# Patient Record
Sex: FEMALE | Race: WHITE | NOT HISPANIC OR LATINO | URBAN - METROPOLITAN AREA
[De-identification: names, ages, dates, MRNs, and addresses within clinical notes are randomized per-mention and may not be internally consistent; named-entity substitution may affect disease eponyms.]

---

## 2018-03-15 ENCOUNTER — OFFICE VISIT CONVERTED (OUTPATIENT)
Dept: SURGERY | Facility: CLINIC | Age: 83
End: 2018-03-15
Attending: PHYSICIAN ASSISTANT

## 2018-04-05 ENCOUNTER — OFFICE VISIT CONVERTED (OUTPATIENT)
Dept: SURGERY | Facility: CLINIC | Age: 83
End: 2018-04-05
Attending: PHYSICIAN ASSISTANT

## 2018-05-07 ENCOUNTER — OFFICE VISIT CONVERTED (OUTPATIENT)
Dept: SURGERY | Facility: CLINIC | Age: 83
End: 2018-05-07
Attending: PHYSICIAN ASSISTANT

## 2018-05-07 ENCOUNTER — CONVERSION ENCOUNTER (OUTPATIENT)
Dept: SURGERY | Facility: CLINIC | Age: 83
End: 2018-05-07

## 2020-12-30 ENCOUNTER — HOSPITAL ENCOUNTER (OUTPATIENT)
Dept: MAMMOGRAPHY | Facility: HOSPITAL | Age: 85
Discharge: HOME | End: 2020-12-30
Attending: INTERNAL MEDICINE

## 2021-03-08 ENCOUNTER — HOSPITAL ENCOUNTER (OUTPATIENT)
Dept: VACCINE CLINIC | Facility: HOSPITAL | Age: 86
Discharge: HOME | End: 2021-03-08
Attending: INTERNAL MEDICINE

## 2021-03-29 ENCOUNTER — HOSPITAL ENCOUNTER (OUTPATIENT)
Dept: VACCINE CLINIC | Facility: HOSPITAL | Age: 86
Discharge: HOME | End: 2021-03-29
Attending: INTERNAL MEDICINE

## 2021-05-16 VITALS — WEIGHT: 225.5 LBS | BODY MASS INDEX: 35.39 KG/M2 | HEIGHT: 67 IN | RESPIRATION RATE: 10 BRPM

## 2021-05-16 VITALS — WEIGHT: 228.5 LBS | BODY MASS INDEX: 35.87 KG/M2 | RESPIRATION RATE: 12 BRPM | HEIGHT: 67 IN

## 2021-05-16 VITALS — HEART RATE: 57 BPM | WEIGHT: 228 LBS | OXYGEN SATURATION: 96 % | BODY MASS INDEX: 35.79 KG/M2 | HEIGHT: 67 IN

## 2022-08-11 ENCOUNTER — APPOINTMENT (OUTPATIENT)
Dept: CT IMAGING | Facility: HOSPITAL | Age: 87
End: 2022-08-11

## 2022-08-11 ENCOUNTER — HOSPITAL ENCOUNTER (INPATIENT)
Facility: HOSPITAL | Age: 87
LOS: 3 days | Discharge: HOME | End: 2022-08-14
Attending: EMERGENCY MEDICINE | Admitting: EMERGENCY MEDICINE

## 2022-08-11 ENCOUNTER — APPOINTMENT (OUTPATIENT)
Dept: GENERAL RADIOLOGY | Facility: HOSPITAL | Age: 87
End: 2022-08-11

## 2022-08-11 DIAGNOSIS — R10.9 ABDOMINAL PAIN, UNSPECIFIED ABDOMINAL LOCATION: ICD-10-CM

## 2022-08-11 DIAGNOSIS — R79.89 ELEVATED LFTS: ICD-10-CM

## 2022-08-11 DIAGNOSIS — R00.1 SYMPTOMATIC BRADYCARDIA: Primary | ICD-10-CM

## 2022-08-11 DIAGNOSIS — K44.9 HIATAL HERNIA: ICD-10-CM

## 2022-08-11 DIAGNOSIS — R19.7 NAUSEA VOMITING AND DIARRHEA: ICD-10-CM

## 2022-08-11 DIAGNOSIS — E87.6 HYPOKALEMIA: ICD-10-CM

## 2022-08-11 DIAGNOSIS — R11.2 NAUSEA VOMITING AND DIARRHEA: ICD-10-CM

## 2022-08-11 LAB
ALBUMIN SERPL-MCNC: 4.2 G/DL (ref 3.5–5.2)
ALBUMIN SERPL-MCNC: 4.4 G/DL (ref 3.5–5.2)
ALBUMIN/GLOB SERPL: 1.7 G/DL
ALBUMIN/GLOB SERPL: 1.8 G/DL
ALP SERPL-CCNC: 76 U/L (ref 39–117)
ALP SERPL-CCNC: 82 U/L (ref 39–117)
ALT SERPL W P-5'-P-CCNC: 2362 U/L (ref 1–33)
ALT SERPL W P-5'-P-CCNC: 2951 U/L (ref 1–33)
ANION GAP SERPL CALCULATED.3IONS-SCNC: 15.2 MMOL/L (ref 5–15)
ANION GAP SERPL CALCULATED.3IONS-SCNC: 16.3 MMOL/L (ref 5–15)
AST SERPL-CCNC: 1347 U/L (ref 1–32)
AST SERPL-CCNC: 2081 U/L (ref 1–32)
BACTERIA UR QL AUTO: ABNORMAL /HPF
BASOPHILS # BLD AUTO: 0.04 10*3/MM3 (ref 0–0.2)
BASOPHILS NFR BLD AUTO: 0.6 % (ref 0–1.5)
BILIRUB SERPL-MCNC: 1.1 MG/DL (ref 0–1.2)
BILIRUB SERPL-MCNC: 1.3 MG/DL (ref 0–1.2)
BILIRUB UR QL STRIP: NEGATIVE
BUN SERPL-MCNC: 17 MG/DL (ref 8–23)
BUN SERPL-MCNC: 20 MG/DL (ref 8–23)
BUN/CREAT SERPL: 26.3 (ref 7–25)
BUN/CREAT SERPL: 26.6 (ref 7–25)
CALCIUM SPEC-SCNC: 8.9 MG/DL (ref 8.6–10.5)
CALCIUM SPEC-SCNC: 9.3 MG/DL (ref 8.6–10.5)
CHLORIDE SERPL-SCNC: 103 MMOL/L (ref 98–107)
CHLORIDE SERPL-SCNC: 105 MMOL/L (ref 98–107)
CLARITY UR: ABNORMAL
CO2 SERPL-SCNC: 22.8 MMOL/L (ref 22–29)
CO2 SERPL-SCNC: 24.7 MMOL/L (ref 22–29)
COLOR UR: YELLOW
CREAT SERPL-MCNC: 0.64 MG/DL (ref 0.57–1)
CREAT SERPL-MCNC: 0.76 MG/DL (ref 0.57–1)
D-LACTATE SERPL-SCNC: 1.5 MMOL/L (ref 0.5–2)
DEPRECATED RDW RBC AUTO: 44.2 FL (ref 37–54)
DEPRECATED RDW RBC AUTO: 44.7 FL (ref 37–54)
EGFRCR SERPLBLD CKD-EPI 2021: 77.4 ML/MIN/1.73
EGFRCR SERPLBLD CKD-EPI 2021: 87.3 ML/MIN/1.73
EOSINOPHIL # BLD AUTO: 0.17 10*3/MM3 (ref 0–0.4)
EOSINOPHIL NFR BLD AUTO: 2.4 % (ref 0.3–6.2)
ERYTHROCYTE [DISTWIDTH] IN BLOOD BY AUTOMATED COUNT: 13.1 % (ref 12.3–15.4)
ERYTHROCYTE [DISTWIDTH] IN BLOOD BY AUTOMATED COUNT: 13.2 % (ref 12.3–15.4)
GLOBULIN UR ELPH-MCNC: 2.3 GM/DL
GLOBULIN UR ELPH-MCNC: 2.6 GM/DL
GLUCOSE SERPL-MCNC: 77 MG/DL (ref 65–99)
GLUCOSE SERPL-MCNC: 86 MG/DL (ref 65–99)
GLUCOSE UR STRIP-MCNC: NEGATIVE MG/DL
HCT VFR BLD AUTO: 37.3 % (ref 34–46.6)
HCT VFR BLD AUTO: 40.1 % (ref 34–46.6)
HGB BLD-MCNC: 13.2 G/DL (ref 12–15.9)
HGB BLD-MCNC: 14 G/DL (ref 12–15.9)
HGB UR QL STRIP.AUTO: ABNORMAL
HOLD SPECIMEN: NORMAL
HOLD SPECIMEN: NORMAL
HYALINE CASTS UR QL AUTO: ABNORMAL /LPF
IMM GRANULOCYTES # BLD AUTO: 0.04 10*3/MM3 (ref 0–0.05)
IMM GRANULOCYTES NFR BLD AUTO: 0.6 % (ref 0–0.5)
KETONES UR QL STRIP: ABNORMAL
LEUKOCYTE ESTERASE UR QL STRIP.AUTO: ABNORMAL
LIPASE SERPL-CCNC: 15 U/L (ref 13–60)
LYMPHOCYTES # BLD AUTO: 1.15 10*3/MM3 (ref 0.7–3.1)
LYMPHOCYTES NFR BLD AUTO: 16.2 % (ref 19.6–45.3)
MAGNESIUM SERPL-MCNC: 2 MG/DL (ref 1.6–2.4)
MCH RBC QN AUTO: 32.5 PG (ref 26.6–33)
MCH RBC QN AUTO: 32.5 PG (ref 26.6–33)
MCHC RBC AUTO-ENTMCNC: 34.9 G/DL (ref 31.5–35.7)
MCHC RBC AUTO-ENTMCNC: 35.4 G/DL (ref 31.5–35.7)
MCV RBC AUTO: 91.9 FL (ref 79–97)
MCV RBC AUTO: 93 FL (ref 79–97)
MONOCYTES # BLD AUTO: 0.32 10*3/MM3 (ref 0.1–0.9)
MONOCYTES NFR BLD AUTO: 4.5 % (ref 5–12)
NEUTROPHILS NFR BLD AUTO: 5.38 10*3/MM3 (ref 1.7–7)
NEUTROPHILS NFR BLD AUTO: 75.7 % (ref 42.7–76)
NITRITE UR QL STRIP: NEGATIVE
NRBC BLD AUTO-RTO: 0 /100 WBC (ref 0–0.2)
PH UR STRIP.AUTO: 6 [PH] (ref 5–8)
PLATELET # BLD AUTO: 222 10*3/MM3 (ref 140–450)
PLATELET # BLD AUTO: 234 10*3/MM3 (ref 140–450)
PMV BLD AUTO: 10 FL (ref 6–12)
PMV BLD AUTO: 9.8 FL (ref 6–12)
POTASSIUM SERPL-SCNC: 2.6 MMOL/L (ref 3.5–5.2)
POTASSIUM SERPL-SCNC: 2.6 MMOL/L (ref 3.5–5.2)
PROT SERPL-MCNC: 6.5 G/DL (ref 6–8.5)
PROT SERPL-MCNC: 7 G/DL (ref 6–8.5)
PROT UR QL STRIP: ABNORMAL
RBC # BLD AUTO: 4.06 10*6/MM3 (ref 3.77–5.28)
RBC # BLD AUTO: 4.31 10*6/MM3 (ref 3.77–5.28)
RBC # UR STRIP: ABNORMAL /HPF
REF LAB TEST METHOD: ABNORMAL
SODIUM SERPL-SCNC: 143 MMOL/L (ref 136–145)
SODIUM SERPL-SCNC: 144 MMOL/L (ref 136–145)
SP GR UR STRIP: 1.02 (ref 1–1.03)
SQUAMOUS #/AREA URNS HPF: ABNORMAL /HPF
T4 FREE SERPL-MCNC: 1.2 NG/DL (ref 0.93–1.7)
TROPONIN I SERPL-MCNC: 0.06 NG/ML (ref 0–0.08)
TSH SERPL DL<=0.05 MIU/L-ACNC: 3.38 UIU/ML (ref 0.27–4.2)
UROBILINOGEN UR QL STRIP: ABNORMAL
WBC # UR STRIP: ABNORMAL /HPF
WBC NRBC COR # BLD: 7.1 10*3/MM3 (ref 3.4–10.8)
WBC NRBC COR # BLD: 7.23 10*3/MM3 (ref 3.4–10.8)
WHOLE BLOOD HOLD COAG: NORMAL
WHOLE BLOOD HOLD SPECIMEN: NORMAL

## 2022-08-11 PROCEDURE — 83690 ASSAY OF LIPASE: CPT | Performed by: EMERGENCY MEDICINE

## 2022-08-11 PROCEDURE — 87040 BLOOD CULTURE FOR BACTERIA: CPT | Performed by: INTERNAL MEDICINE

## 2022-08-11 PROCEDURE — 84443 ASSAY THYROID STIM HORMONE: CPT | Performed by: INTERNAL MEDICINE

## 2022-08-11 PROCEDURE — 86235 NUCLEAR ANTIGEN ANTIBODY: CPT | Performed by: INTERNAL MEDICINE

## 2022-08-11 PROCEDURE — 87088 URINE BACTERIA CULTURE: CPT | Performed by: EMERGENCY MEDICINE

## 2022-08-11 PROCEDURE — 86431 RHEUMATOID FACTOR QUANT: CPT | Performed by: INTERNAL MEDICINE

## 2022-08-11 PROCEDURE — 74177 CT ABD & PELVIS W/CONTRAST: CPT

## 2022-08-11 PROCEDURE — 85025 COMPLETE CBC W/AUTO DIFF WBC: CPT | Performed by: EMERGENCY MEDICINE

## 2022-08-11 PROCEDURE — 83735 ASSAY OF MAGNESIUM: CPT | Performed by: NURSE PRACTITIONER

## 2022-08-11 PROCEDURE — 83605 ASSAY OF LACTIC ACID: CPT | Performed by: EMERGENCY MEDICINE

## 2022-08-11 PROCEDURE — 85027 COMPLETE CBC AUTOMATED: CPT | Performed by: INTERNAL MEDICINE

## 2022-08-11 PROCEDURE — 93005 ELECTROCARDIOGRAM TRACING: CPT | Performed by: NURSE PRACTITIONER

## 2022-08-11 PROCEDURE — 81001 URINALYSIS AUTO W/SCOPE: CPT | Performed by: EMERGENCY MEDICINE

## 2022-08-11 PROCEDURE — P9612 CATHETERIZE FOR URINE SPEC: HCPCS

## 2022-08-11 PROCEDURE — 36415 COLL VENOUS BLD VENIPUNCTURE: CPT | Performed by: EMERGENCY MEDICINE

## 2022-08-11 PROCEDURE — 93010 ELECTROCARDIOGRAM REPORT: CPT | Performed by: INTERNAL MEDICINE

## 2022-08-11 PROCEDURE — 0 IOPAMIDOL PER 1 ML: Performed by: EMERGENCY MEDICINE

## 2022-08-11 PROCEDURE — 25010000002 CEFTRIAXONE PER 250 MG: Performed by: NURSE PRACTITIONER

## 2022-08-11 PROCEDURE — 99285 EMERGENCY DEPT VISIT HI MDM: CPT

## 2022-08-11 PROCEDURE — 80074 ACUTE HEPATITIS PANEL: CPT | Performed by: INTERNAL MEDICINE

## 2022-08-11 PROCEDURE — 0 POTASSIUM CHLORIDE 10 MEQ/100ML SOLUTION: Performed by: NURSE PRACTITIONER

## 2022-08-11 PROCEDURE — 87086 URINE CULTURE/COLONY COUNT: CPT | Performed by: EMERGENCY MEDICINE

## 2022-08-11 PROCEDURE — 71045 X-RAY EXAM CHEST 1 VIEW: CPT

## 2022-08-11 PROCEDURE — 80053 COMPREHEN METABOLIC PANEL: CPT | Performed by: EMERGENCY MEDICINE

## 2022-08-11 PROCEDURE — 87186 SC STD MICRODIL/AGAR DIL: CPT | Performed by: EMERGENCY MEDICINE

## 2022-08-11 PROCEDURE — 0 POTASSIUM CHLORIDE 10 MEQ/100ML SOLUTION: Performed by: INTERNAL MEDICINE

## 2022-08-11 PROCEDURE — 80053 COMPREHEN METABOLIC PANEL: CPT | Performed by: INTERNAL MEDICINE

## 2022-08-11 PROCEDURE — 84439 ASSAY OF FREE THYROXINE: CPT | Performed by: INTERNAL MEDICINE

## 2022-08-11 PROCEDURE — 84484 ASSAY OF TROPONIN QUANT: CPT

## 2022-08-11 RX ORDER — POTASSIUM CHLORIDE 750 MG/1
20 CAPSULE, EXTENDED RELEASE ORAL 2 TIMES DAILY WITH MEALS
Status: DISCONTINUED | OUTPATIENT
Start: 2022-08-11 | End: 2022-08-14 | Stop reason: HOSPADM

## 2022-08-11 RX ORDER — OXYBUTYNIN CHLORIDE 5 MG/1
5 TABLET, EXTENDED RELEASE ORAL DAILY
Status: DISCONTINUED | OUTPATIENT
Start: 2022-08-11 | End: 2022-08-14 | Stop reason: HOSPADM

## 2022-08-11 RX ORDER — ONDANSETRON 2 MG/ML
4 INJECTION INTRAMUSCULAR; INTRAVENOUS EVERY 6 HOURS PRN
Status: DISCONTINUED | OUTPATIENT
Start: 2022-08-11 | End: 2022-08-14 | Stop reason: HOSPADM

## 2022-08-11 RX ORDER — CEFTRIAXONE 1 G/1
1 INJECTION, POWDER, FOR SOLUTION INTRAMUSCULAR; INTRAVENOUS EVERY 24 HOURS
Status: DISCONTINUED | OUTPATIENT
Start: 2022-08-11 | End: 2022-08-11

## 2022-08-11 RX ORDER — ASPIRIN 325 MG
325 TABLET ORAL DAILY
Status: DISCONTINUED | OUTPATIENT
Start: 2022-08-11 | End: 2022-08-14 | Stop reason: HOSPADM

## 2022-08-11 RX ORDER — ALBUTEROL SULFATE 90 UG/1
2 AEROSOL, METERED RESPIRATORY (INHALATION) EVERY 4 HOURS PRN
Status: DISCONTINUED | OUTPATIENT
Start: 2022-08-11 | End: 2022-08-14 | Stop reason: HOSPADM

## 2022-08-11 RX ORDER — ALUMINA, MAGNESIA, AND SIMETHICONE 2400; 2400; 240 MG/30ML; MG/30ML; MG/30ML
15 SUSPENSION ORAL EVERY 6 HOURS PRN
Status: DISCONTINUED | OUTPATIENT
Start: 2022-08-11 | End: 2022-08-14 | Stop reason: HOSPADM

## 2022-08-11 RX ORDER — PAROXETINE HYDROCHLORIDE 20 MG/1
20 TABLET, FILM COATED ORAL EVERY MORNING
Status: DISCONTINUED | OUTPATIENT
Start: 2022-08-12 | End: 2022-08-14 | Stop reason: HOSPADM

## 2022-08-11 RX ORDER — POTASSIUM CHLORIDE 7.45 MG/ML
10 INJECTION INTRAVENOUS ONCE
Status: COMPLETED | OUTPATIENT
Start: 2022-08-11 | End: 2022-08-11

## 2022-08-11 RX ORDER — NITROGLYCERIN 0.4 MG/1
0.4 TABLET SUBLINGUAL
Status: DISCONTINUED | OUTPATIENT
Start: 2022-08-11 | End: 2022-08-14 | Stop reason: HOSPADM

## 2022-08-11 RX ORDER — CEFTRIAXONE SODIUM 1 G/50ML
1 INJECTION, SOLUTION INTRAVENOUS ONCE
Status: COMPLETED | OUTPATIENT
Start: 2022-08-11 | End: 2022-08-11

## 2022-08-11 RX ORDER — ACETAMINOPHEN 325 MG/1
650 TABLET ORAL EVERY 4 HOURS PRN
Status: DISCONTINUED | OUTPATIENT
Start: 2022-08-11 | End: 2022-08-14 | Stop reason: HOSPADM

## 2022-08-11 RX ORDER — CEFTRIAXONE SODIUM 1 G/50ML
1 INJECTION, SOLUTION INTRAVENOUS EVERY 24 HOURS
Status: DISCONTINUED | OUTPATIENT
Start: 2022-08-12 | End: 2022-08-13

## 2022-08-11 RX ORDER — LEVOTHYROXINE SODIUM 0.12 MG/1
125 TABLET ORAL
Status: DISCONTINUED | OUTPATIENT
Start: 2022-08-12 | End: 2022-08-14 | Stop reason: HOSPADM

## 2022-08-11 RX ORDER — SODIUM CHLORIDE 0.9 % (FLUSH) 0.9 %
10 SYRINGE (ML) INJECTION AS NEEDED
Status: DISCONTINUED | OUTPATIENT
Start: 2022-08-11 | End: 2022-08-14 | Stop reason: HOSPADM

## 2022-08-11 RX ORDER — POTASSIUM CHLORIDE 750 MG/1
20 CAPSULE, EXTENDED RELEASE ORAL 2 TIMES DAILY WITH MEALS
Status: DISCONTINUED | OUTPATIENT
Start: 2022-08-11 | End: 2022-08-11 | Stop reason: SDUPTHER

## 2022-08-11 RX ORDER — POTASSIUM CHLORIDE 7.45 MG/ML
10 INJECTION INTRAVENOUS
Status: COMPLETED | OUTPATIENT
Start: 2022-08-11 | End: 2022-08-12

## 2022-08-11 RX ORDER — MULTIPLE VITAMINS W/ MINERALS TAB 9MG-400MCG
1 TAB ORAL DAILY
Status: DISCONTINUED | OUTPATIENT
Start: 2022-08-11 | End: 2022-08-14 | Stop reason: HOSPADM

## 2022-08-11 RX ADMIN — OXYBUTYNIN CHLORIDE 5 MG: 5 TABLET, EXTENDED RELEASE ORAL at 23:00

## 2022-08-11 RX ADMIN — POTASSIUM CHLORIDE 20 MEQ: 750 CAPSULE, EXTENDED RELEASE ORAL at 17:44

## 2022-08-11 RX ADMIN — CEFTRIAXONE SODIUM 1 G: 1 INJECTION, SOLUTION INTRAVENOUS at 15:01

## 2022-08-11 RX ADMIN — POTASSIUM CHLORIDE 10 MEQ: 7.46 INJECTION, SOLUTION INTRAVENOUS at 13:40

## 2022-08-11 RX ADMIN — POTASSIUM CHLORIDE 20 MEQ: 750 CAPSULE, EXTENDED RELEASE ORAL at 23:01

## 2022-08-11 RX ADMIN — POTASSIUM CHLORIDE 10 MEQ: 7.46 INJECTION, SOLUTION INTRAVENOUS at 23:01

## 2022-08-11 RX ADMIN — MULTIPLE VITAMINS W/ MINERALS TAB 1 TABLET: TAB at 23:00

## 2022-08-11 RX ADMIN — IOPAMIDOL 100 ML: 755 INJECTION, SOLUTION INTRAVENOUS at 13:30

## 2022-08-11 NOTE — ED NOTES
Pt states she has a slow heart rate all the time, she stated she had a stent placed in the proximal LAD in 2007 (pt has card with these details)

## 2022-08-11 NOTE — ED NOTES
"Pt seems A&O, this morning, but is able to tell things that have happened previously, and blames COVID for her having confusion at times because she was unable to get out and about like normal\"  "

## 2022-08-11 NOTE — PLAN OF CARE
Goal Outcome Evaluation: Patient arrived resting no major complaints. Admission complete.

## 2022-08-11 NOTE — ED PROVIDER NOTES
Emergency Department Encounter    Room number: 03/03  Date seen: 8/11/2022  PCP: Aidan Serrano MD      History provided by:  Patient   used: No          HPI:  Chief complaint: nausea and vomiting    Context: Cristina Pina is a 86 y.o. female with a history of hypertension, hysterectomy, cholecystectomy, stent to LAD 07 who presents to the ED with nausea, vomiting, diarrhea, generalized weakness for 4 days, dry cough, dysuria, and abdominal pain.  Patient denies fever, sore throat, chest pain, shortness of breath, hematuria, bloody stools, abdominal swelling, diaphoresis, or other complaint.  Patient denies smoking or alcohol use.      Location: Nausea and vomiting  Quality: n/a  Severity: n/a  Radiation: n/a  Duration: constant  Onset: Monday (4 days)  Modifying factors: Diarrhea, generalized weakness, cough, dysuria, abdominal pain        Old records reviewed:  Seen at the [facility] today for nausea vomiting and generalized weakness referred to the emergency department for further work-up    Triage Vitals:  ED Triage Vitals [08/11/22 1111]   Temp Heart Rate Resp BP SpO2   98 °F (36.7 °C) 65 18 164/73 98 %      Temp src Heart Rate Source Patient Position BP Location FiO2 (%)   Oral -- Sitting Left arm --         Review of Systems   Constitutional: Negative for chills and fever.   HENT: Negative for rhinorrhea and sore throat.    Respiratory: Positive for cough. Negative for shortness of breath and stridor.    Cardiovascular: Negative for chest pain and palpitations.   Gastrointestinal: Positive for abdominal pain and diarrhea. Negative for abdominal distention, blood in stool and constipation.   Genitourinary: Positive for dysuria. Negative for flank pain and hematuria.   Musculoskeletal: Negative for back pain and myalgias.   Skin: Negative for rash and wound.   Neurological: Positive for weakness (generalized). Negative for dizziness and headaches.   Psychiatric/Behavioral: Negative for sleep  disturbance and suicidal ideas.         Physical Exam  Constitutional:       Appearance: Normal appearance. She is obese.   HENT:      Head: Normocephalic and atraumatic.      Nose: Nose normal.   Eyes:      Extraocular Movements: Extraocular movements intact.      Pupils: Pupils are equal, round, and reactive to light.   Cardiovascular:      Rate and Rhythm: Regular rhythm. Bradycardia present.   Pulmonary:      Effort: Pulmonary effort is normal.      Breath sounds: Normal breath sounds.   Abdominal:      General: Bowel sounds are normal.      Palpations: Abdomen is soft.      Tenderness: There is abdominal tenderness (diffuse spares LUQ).   Musculoskeletal:         General: Normal range of motion.      Cervical back: Normal range of motion.   Skin:     General: Skin is warm.   Neurological:      General: No focal deficit present.      Mental Status: She is alert and oriented to person, place, and time.   Psychiatric:         Mood and Affect: Mood normal.         Behavior: Behavior normal.         Thought Content: Thought content normal.         Judgment: Judgment normal.             Allergies:  Ciprofibrate and Sulfa antibiotics  Patient's allergies reviewed    Past Medical History:  Past Medical History:   Diagnosis Date   • Hypertension          Past Surgical History:  Past Surgical History:   Procedure Laterality Date   • CARDIAC SURGERY     • CHOLECYSTECTOMY     • EYE SURGERY     • HYSTERECTOMY     • KNEE CLOSED REDUCTION         Procedures    Labs Reviewed   COMPREHENSIVE METABOLIC PANEL - Abnormal; Notable for the following components:       Result Value    Potassium 2.6 (*)     ALT (SGPT) 2,951 (*)     AST (SGOT) 2,081 (*)     Total Bilirubin 1.3 (*)     BUN/Creatinine Ratio 26.3 (*)     Anion Gap 15.2 (*)     All other components within normal limits    Narrative:     GFR Normal >60  Chronic Kidney Disease <60  Kidney Failure <15     CBC WITH AUTO DIFFERENTIAL - Abnormal; Notable for the following  components:    Lymphocyte % 16.2 (*)     Monocyte % 4.5 (*)     Immature Grans % 0.6 (*)     All other components within normal limits   URINALYSIS W/ CULTURE IF INDICATED - Abnormal; Notable for the following components:    Appearance, UA Turbid (*)     Ketones, UA 80 mg/dL (3+) (*)     Blood, UA Moderate (2+) (*)     Protein, UA 30 mg/dL (1+) (*)     Leuk Esterase, UA Large (3+) (*)     All other components within normal limits    Narrative:     In absence of clinical symptoms, the presence of pyuria, bacteria, and/or nitrites on the urinalysis result does not correlate with infection.   URINALYSIS, MICROSCOPIC ONLY - Abnormal; Notable for the following components:    RBC, UA 3-5 (*)     WBC, UA 13-20 (*)     All other components within normal limits   LIPASE - Normal   LACTIC ACID, PLASMA - Normal   MAGNESIUM - Normal   POCT TROPONIN I - Normal   URINE CULTURE   RAINBOW DRAW    Narrative:     The following orders were created for panel order [facility] Draw.  Procedure                               Abnormality         Status                     ---------                               -----------         ------                     Green Top (Gel)[[id number]]                                  Final result               Lavender Top[[id number]]                                     Final result               Gold Top - SST[[id number]]                                   Final result               Light Blue Top[[id number]]                                   Final result                 Please view results for these tests on the individual orders.   URINALYSIS W/ CULTURE IF INDICATED   POCT TROPONIN I   CBC AND DIFFERENTIAL    Narrative:     The following orders were created for panel order CBC & Differential.  Procedure                               Abnormality         Status                     ---------                               -----------         ------                     CBC Auto Differential[[id number]]        Abnormal             Final result                 Please view results for these tests on the individual orders.   GREEN TOP   LAVENDER TOP   GOLD TOP - SST   LIGHT BLUE TOP   POCT TROPONIN-I WITH HOLD TUBE    Narrative:     The following orders were created for panel order POC Troponin I with Hold Tube.  Procedure                               Abnormality         Status                     ---------                               -----------         ------                     POC Troponin I[[id number]]                                                              HOLD Troponin-I Tube[[id number]]                                                          Please view results for these tests on the individual orders.   HOLD ISTAT TROPONIN-I TUBE       CT Abdomen Pelvis With Contrast    Result Date: 8/11/2022  Narrative: PROCEDURE: CT ABDOMEN PELVIS W CONTRAST  COMPARISON: None  INDICATIONS: r/o diverticulitis,LLQ PAIN,NAUSEA,LOSS OF APPITITE X 4 DAYS  TECHNIQUE: After obtaining the patient's consent, CT images were created with non-ionic intravenous contrast material.   PROTOCOL:   Standard imaging protocol performed    RADIATION:   DLP: 994.4mGy*cm   Automated exposure control was utilized to minimize radiation dose. CONTRAST: 100cc Isovue 370 I.V.  FINDINGS:  There is bronchiectasis and some linear scarring in lung bases.  There is a very large hiatal hernia.  There are coronary artery calcifications.  There is diffuse hepatic steatosis.  Liver size is normal.  There is a hypodense lesion in the lateral segment of the left lobe of the liver consistent with a cyst.  The spleen is normal.  The adrenal glands and the pancreas are normal.  The gallbladder is surgically absent.  There is no significant biliary dilatation.  There is a simple cyst in the right kidney.  The left kidney is normal.  There are no dilated or thickened loops of bowel in the upper abdomen.  There is sigmoid diverticulosis but no CT evidence of diverticulitis.   The uterus is absent.  Small amount of air is present within the urinary bladder.  The bladder is largely decompressed with mild bladder wall thickening.  There are changes of degenerative disc disease in the lumbar spine, most marked at the L4-5 level.      Impression:   1. No acute findings in the abdomen or pelvis. 2. Cholecystectomy and hysterectomy 3. Sigmoid diverticulosis without evidence of diverticulitis 4. Decompressed urinary bladder with mild bladder wall thickening.  There is a small amount of air within the urinary bladder. 5. Incidental hepatic and renal cysts. 6. Large hiatal hernia.  7. Coronary artery calcifications 8. Diffuse hepatic steatosis     Harrison Montano MD       Electronically Signed and Approved By: Harrison Montano MD on 8/11/2022 at 13:56             XR Chest 1 View    Result Date: 8/11/2022  Narrative: PROCEDURE: XR CHEST 1 VW  COMPARISON: None  INDICATIONS: WEAKNESS TODAY  FINDINGS:  There appears to be a large hiatal hernia.  The heart appears mildly enlarged.  Mediastinal contour appears within normal limits.  No effusion or pneumothorax is seen.  No suspicious pulmonary infiltrate is identified.  No acute osseous abnormality is seen.      Impression:   1. Probable large hiatal hernia. 2. Mild cardiomegaly.       YI CHRISTENSEN MD       Electronically Signed and Approved By: IY CHRISTENSEN MD on 8/11/2022 at 12:48               Medications   sodium chloride 0.9 % flush 10 mL (has no administration in time range)   potassium chloride 10 mEq in 100 mL IVPB (0 mEq Intravenous Stopped 8/11/22 1458)   iopamidol (ISOVUE-370) 76 % injection 100 mL (100 mL Intravenous Given 8/11/22 1330)   cefTRIAXone (ROCEPHIN) IVPB 1 g (0 g Intravenous Stopped 8/11/22 1538)         Progress Notes:    1530 Patient rechecked I have personally reviewed all radiology findings, incidental and otherwise, with the patient and made patient aware of what needs to be followed up with PCP.    I discussed with the  "patient indications of admission including lab results and ED imaging interpretation.  The patient understands and agrees with the plan of admission.  All questions and concerns regarding diagnosis or ED results are answered at this time.        Vitals:    08/11/22 1111 08/11/22 1201 08/11/22 1317   BP: 164/73 150/71 171/83   BP Location: Left arm     Patient Position: Sitting     Pulse: 65 (!) 44 52   Resp: 18     Temp: 98 °F (36.7 °C)     TempSrc: Oral     SpO2: 98% 94% 97%   Weight: 99.2 kg (218 lb 11.1 oz)     Height: 170.2 cm (67\")             Final diagnoses:   Symptomatic bradycardia (generally weak)   Hypokalemia   Nausea vomiting and diarrhea   Abdominal pain, unspecified abdominal location   Elevated LFTs   Hiatal hernia       Prescriptions:        Medication List      ASK your doctor about these medications    albuterol sulfate  (90 Base) MCG/ACT inhaler  Commonly known as: PROVENTIL HFA;VENTOLIN HFA;PROAIR HFA     aspirin 325 MG tablet     levothyroxine 125 MCG tablet  Commonly known as: SYNTHROID, LEVOTHROID     multivitamin with minerals tablet tablet     Omega-3 1000 MG capsule     oxybutynin XL 5 MG 24 hr tablet  Commonly known as: DITROPAN-XL     PARoxetine 20 MG tablet  Commonly known as: PAXIL     simvastatin 20 MG tablet  Commonly known as: ZOCOR                  Consults:   1558 Spoke with laura Sanchez regarding history, workup, findings, and treatments given in the ED. Discussed concerns.  Agrees to admit the patient to a telemetry bed.  He has no questions or further recommendations      MDM  Number of Diagnoses or Management Options     Amount and/or Complexity of Data Reviewed  Clinical lab tests: ordered  Tests in the radiology section of CPT®: ordered  Review and summarize past medical records: yes  Discuss the patient with other providers: yes  Independent visualization of images, tracings, or specimens: yes    Risk of Complications, Morbidity, and/or " Mortality  Presenting problems: high  Diagnostic procedures: high  Management options: moderate    Patient Progress  Patient progress: improved      (R00.1) Symptomatic bradycardia (generally weak)    (E87.6) Hypokalemia    (R11.2,  R19.7) Nausea vomiting and diarrhea    (R10.9) Abdominal pain, unspecified abdominal location    (R79.89) Elevated LFTs    (K44.9) Hiatal hernia      Reviewing your test results and medical records in your chart is not a substitution for discussing these with your health care provider.  Please contact your primary care provider to discuss any questions or concerns you may have regarding these test results.      Part of this note may be an electronic transcription/translation of spoken language to printed text using the Dragon Dictation System.  The electronic translation of spoken language may permit erroneous or at times nonsensical words or phrases to be inadvertently transcribed.  Although I have reviewed the note for such errors some may still exist.             Shanelle Becker, APRN  08/11/22 1552

## 2022-08-11 NOTE — H&P
[facility]   HISTORY AND PHYSICAL    Patient Name: Cristina Pina  : 1935  MRN: [id number]  Primary Care Physician:  Aidan Serrano MD  Date of admission: 2022    Subjective   Subjective     Chief Complaint: Patient is 86 years old white female patient has been brought to the hospital with nausea and vomiting she also has abdominal pain liver enzymes are found to be elevated patient has bradycardia past history of smoking does not drink alcohol patient has had hysterectomy and urinary incontinence and recurrent urinary tract infection patient also has history of knee replacement she had cardiac stent inserted she has had cholecystectomy hysterectomy and eye surgery she has had evidence of urinary tract infection at this time with bradycardia    HPI: Patient does not appear to be in acute distress but has abnormal findings with urinary tract infection was brought in because of nausea and vomiting    Cristina Pina is a 86 y.o. female denies any history of diarrhea denies history of any bloody stools    Review of Systems   All systems were reviewed and negative except for: Reviewed    Personal History     Past Medical History:   Diagnosis Date   • Hypertension        Past Surgical History:   Procedure Laterality Date   • CARDIAC SURGERY     • CHOLECYSTECTOMY     • EYE SURGERY     • HYSTERECTOMY     • KNEE CLOSED REDUCTION         Family History: family history is not on file. Otherwise pertinent FHx was reviewed and not pertinent to current issue.    Social History:  reports that she has never smoked. She has never used smokeless tobacco. She reports previous alcohol use.    Home Medications:  Omega-3, PARoxetine, albuterol sulfate HFA, aspirin, levothyroxine, multivitamin with minerals, oxybutynin XL, and simvastatin      Allergies:  Allergies   Allergen Reactions   • Ciprofibrate Hives   • Sulfa Antibiotics Hives       Objective   Objective     Vitals:   Temp:  [98 °F (36.7 °C)-98.2 °F (36.8 °C)] 98  °F (36.7 °C)  Heart Rate:  [44-65] 50  Resp:  [18-20] 18  BP: (125-171)/(64-83) 166/72  Physical Exam    Constitutional: Awake, alert   Eyes: PERRLA, sclerae anicteric, no conjunctival injection   HENT: NCAT, mucous membranes moist   Neck: Supple, no thyromegaly, no lymphadenopathy, trachea midline   Respiratory: Clear to auscultation bilaterally, nonlabored respirations    Cardiovascular: RRR, no murmurs, rubs, or gallops, palpable pedal pulses bilaterally   Gastrointestinal: Positive bowel sounds, soft, nontender, nondistended   Musculoskeletal: No bilateral ankle edema, no clubbing or cyanosis to extremities   Psychiatric: Appropriate affect, cooperative   Neurologic: Oriented x 3, strength symmetric in all extremities, Cranial Nerves grossly intact to confrontation, speech clear   Skin: No rashes   No palpable adenopathy  Result Review    Result Review:  I have personally reviewed the results from the time of this admission to 8/11/2022 17:58 EDT and agree with these findings:  [x]  Laboratory normal LFTs  []  Microbiology  []  Radiology  []  EKG/Telemetry   []  Cardiology/Vascular   []  Pathology  []  Old records  []  Other:  Most notable findings include: Normal LFTs and urinary tract infection    Assessment & Plan   Assessment / Plan     Brief Patient Summary:  Cristina Pina is a 86 y.o. female who with abnormal LFTs nausea vomiting    Active Hospital Problems:  Active Hospital Problems    Diagnosis    • Symptomatic bradycardia        Plan:   History of coronary artery disease hypothyroidism we will get IV antibiotics cardiology consultation    DVT prophylaxis:  No DVT prophylaxis order currently exists.    CODE STATUS:         Admission Status:  I believe this patient meets inpatient status.    Electronically signed by Felice Guillermo MD, 08/11/22, 5:58 PM EDT.

## 2022-08-12 LAB
ALBUMIN SERPL-MCNC: 3.8 G/DL (ref 3.5–5.2)
ALBUMIN/GLOB SERPL: 1.6 G/DL
ALP SERPL-CCNC: 71 U/L (ref 39–117)
ALT SERPL W P-5'-P-CCNC: 1917 U/L (ref 1–33)
AMYLASE SERPL-CCNC: 42 U/L (ref 28–100)
ANION GAP SERPL CALCULATED.3IONS-SCNC: 16.5 MMOL/L (ref 5–15)
AST SERPL-CCNC: 879 U/L (ref 1–32)
BASOPHILS # BLD AUTO: 0.04 10*3/MM3 (ref 0–0.2)
BASOPHILS NFR BLD AUTO: 0.6 % (ref 0–1.5)
BILIRUB SERPL-MCNC: 1 MG/DL (ref 0–1.2)
BUN SERPL-MCNC: 16 MG/DL (ref 8–23)
BUN/CREAT SERPL: 27.1 (ref 7–25)
CALCIUM SPEC-SCNC: 8.5 MG/DL (ref 8.6–10.5)
CHLORIDE SERPL-SCNC: 104 MMOL/L (ref 98–107)
CO2 SERPL-SCNC: 23.5 MMOL/L (ref 22–29)
CREAT SERPL-MCNC: 0.59 MG/DL (ref 0.57–1)
DEPRECATED RDW RBC AUTO: 43.4 FL (ref 37–54)
EGFRCR SERPLBLD CKD-EPI 2021: 89 ML/MIN/1.73
EOSINOPHIL # BLD AUTO: 0.27 10*3/MM3 (ref 0–0.4)
EOSINOPHIL NFR BLD AUTO: 3.9 % (ref 0.3–6.2)
ERYTHROCYTE [DISTWIDTH] IN BLOOD BY AUTOMATED COUNT: 12.9 % (ref 12.3–15.4)
GLOBULIN UR ELPH-MCNC: 2.4 GM/DL
GLUCOSE SERPL-MCNC: 71 MG/DL (ref 65–99)
HAV IGM SERPL QL IA: NORMAL
HBV CORE IGM SERPL QL IA: NORMAL
HBV SURFACE AG SERPL QL IA: NORMAL
HCT VFR BLD AUTO: 35.4 % (ref 34–46.6)
HCV AB SER DONR QL: NORMAL
HGB BLD-MCNC: 12.2 G/DL (ref 12–15.9)
IMM GRANULOCYTES # BLD AUTO: 0.04 10*3/MM3 (ref 0–0.05)
IMM GRANULOCYTES NFR BLD AUTO: 0.6 % (ref 0–0.5)
INR PPP: 1.2 (ref 0.86–1.15)
IRON 24H UR-MRATE: 71 MCG/DL (ref 37–145)
IRON SATN MFR SERPL: 23 % (ref 20–50)
LYMPHOCYTES # BLD AUTO: 1.47 10*3/MM3 (ref 0.7–3.1)
LYMPHOCYTES NFR BLD AUTO: 21.2 % (ref 19.6–45.3)
MCH RBC QN AUTO: 31.9 PG (ref 26.6–33)
MCHC RBC AUTO-ENTMCNC: 34.5 G/DL (ref 31.5–35.7)
MCV RBC AUTO: 92.4 FL (ref 79–97)
MONOCYTES # BLD AUTO: 0.45 10*3/MM3 (ref 0.1–0.9)
MONOCYTES NFR BLD AUTO: 6.5 % (ref 5–12)
NEUTROPHILS NFR BLD AUTO: 4.68 10*3/MM3 (ref 1.7–7)
NEUTROPHILS NFR BLD AUTO: 67.2 % (ref 42.7–76)
NRBC BLD AUTO-RTO: 0 /100 WBC (ref 0–0.2)
PLATELET # BLD AUTO: 219 10*3/MM3 (ref 140–450)
PMV BLD AUTO: 9.6 FL (ref 6–12)
POTASSIUM SERPL-SCNC: 2.8 MMOL/L (ref 3.5–5.2)
PROT SERPL-MCNC: 6.2 G/DL (ref 6–8.5)
PROTHROMBIN TIME: 15.4 SECONDS (ref 11.8–14.9)
RBC # BLD AUTO: 3.83 10*6/MM3 (ref 3.77–5.28)
SARS-COV-2 RNA PNL SPEC NAA+PROBE: NOT DETECTED
SODIUM SERPL-SCNC: 144 MMOL/L (ref 136–145)
TIBC SERPL-MCNC: 314 MCG/DL (ref 298–536)
TRANSFERRIN SERPL-MCNC: 211 MG/DL (ref 200–360)
TSH SERPL DL<=0.05 MIU/L-ACNC: 3.95 UIU/ML (ref 0.27–4.2)
VIT B12 BLD-MCNC: >2000 PG/ML (ref 211–946)
WBC NRBC COR # BLD: 6.95 10*3/MM3 (ref 3.4–10.8)

## 2022-08-12 PROCEDURE — 85025 COMPLETE CBC W/AUTO DIFF WBC: CPT | Performed by: INTERNAL MEDICINE

## 2022-08-12 PROCEDURE — 80053 COMPREHEN METABOLIC PANEL: CPT | Performed by: INTERNAL MEDICINE

## 2022-08-12 PROCEDURE — U0004 COV-19 TEST NON-CDC HGH THRU: HCPCS | Performed by: INTERNAL MEDICINE

## 2022-08-12 PROCEDURE — 99221 1ST HOSP IP/OBS SF/LOW 40: CPT | Performed by: INTERNAL MEDICINE

## 2022-08-12 PROCEDURE — 85610 PROTHROMBIN TIME: CPT | Performed by: INTERNAL MEDICINE

## 2022-08-12 PROCEDURE — 82150 ASSAY OF AMYLASE: CPT | Performed by: INTERNAL MEDICINE

## 2022-08-12 PROCEDURE — 86738 MYCOPLASMA ANTIBODY: CPT | Performed by: INTERNAL MEDICINE

## 2022-08-12 PROCEDURE — 94799 UNLISTED PULMONARY SVC/PX: CPT

## 2022-08-12 PROCEDURE — 82607 VITAMIN B-12: CPT | Performed by: INTERNAL MEDICINE

## 2022-08-12 PROCEDURE — 0 POTASSIUM CHLORIDE 10 MEQ/100ML SOLUTION: Performed by: INTERNAL MEDICINE

## 2022-08-12 PROCEDURE — 84466 ASSAY OF TRANSFERRIN: CPT | Performed by: INTERNAL MEDICINE

## 2022-08-12 PROCEDURE — 84443 ASSAY THYROID STIM HORMONE: CPT | Performed by: INTERNAL MEDICINE

## 2022-08-12 PROCEDURE — 83540 ASSAY OF IRON: CPT | Performed by: INTERNAL MEDICINE

## 2022-08-12 PROCEDURE — 25010000002 CEFTRIAXONE PER 250 MG: Performed by: INTERNAL MEDICINE

## 2022-08-12 RX ORDER — POTASSIUM CHLORIDE 7.45 MG/ML
10 INJECTION INTRAVENOUS
Status: COMPLETED | OUTPATIENT
Start: 2022-08-12 | End: 2022-08-12

## 2022-08-12 RX ORDER — POTASSIUM CHLORIDE 7.45 MG/ML
10 INJECTION INTRAVENOUS
Status: DISCONTINUED | OUTPATIENT
Start: 2022-08-12 | End: 2022-08-12

## 2022-08-12 RX ORDER — LOSARTAN POTASSIUM 25 MG/1
25 TABLET ORAL 2 TIMES DAILY
Status: DISCONTINUED | OUTPATIENT
Start: 2022-08-12 | End: 2022-08-14

## 2022-08-12 RX ADMIN — CEFTRIAXONE SODIUM 1 G: 1 INJECTION, SOLUTION INTRAVENOUS at 09:01

## 2022-08-12 RX ADMIN — MULTIPLE VITAMINS W/ MINERALS TAB 1 TABLET: TAB at 08:58

## 2022-08-12 RX ADMIN — LEVOTHYROXINE SODIUM 125 MCG: 125 TABLET ORAL at 07:33

## 2022-08-12 RX ADMIN — POTASSIUM CHLORIDE 10 MEQ: 7.46 INJECTION, SOLUTION INTRAVENOUS at 12:59

## 2022-08-12 RX ADMIN — ASPIRIN 325 MG ORAL TABLET 325 MG: 325 PILL ORAL at 08:58

## 2022-08-12 RX ADMIN — OXYBUTYNIN CHLORIDE 5 MG: 5 TABLET, EXTENDED RELEASE ORAL at 08:58

## 2022-08-12 RX ADMIN — POTASSIUM CHLORIDE 10 MEQ: 7.46 INJECTION, SOLUTION INTRAVENOUS at 02:04

## 2022-08-12 RX ADMIN — LOSARTAN POTASSIUM 25 MG: 25 TABLET, FILM COATED ORAL at 20:18

## 2022-08-12 RX ADMIN — POTASSIUM CHLORIDE 20 MEQ: 750 CAPSULE, EXTENDED RELEASE ORAL at 17:19

## 2022-08-12 RX ADMIN — LOSARTAN POTASSIUM 25 MG: 25 TABLET, FILM COATED ORAL at 11:41

## 2022-08-12 RX ADMIN — PAROXETINE HYDROCHLORIDE 20 MG: 20 TABLET, FILM COATED ORAL at 11:40

## 2022-08-12 RX ADMIN — POTASSIUM CHLORIDE 10 MEQ: 7.46 INJECTION, SOLUTION INTRAVENOUS at 14:10

## 2022-08-12 RX ADMIN — POTASSIUM CHLORIDE 20 MEQ: 750 CAPSULE, EXTENDED RELEASE ORAL at 08:58

## 2022-08-12 RX ADMIN — POTASSIUM CHLORIDE 10 MEQ: 7.46 INJECTION, SOLUTION INTRAVENOUS at 11:39

## 2022-08-12 RX ADMIN — POTASSIUM CHLORIDE 10 MEQ: 7.46 INJECTION, SOLUTION INTRAVENOUS at 00:45

## 2022-08-12 NOTE — PROGRESS NOTES
[facility]     Progress Note    Patient Name: Cristina Pina  : 1935  MRN: [id number]  Primary Care Physician:  Aidan Serrano MD  Date of admission: 2022    Subjective   Subjective     Chief Complaint: Liver enzymes are started improving get GI consultation patient feeling much better but she does have hypokalemia which will be corrected probably viral syndrome the etiologies will have to be ruled out like ischemia    HPI:  Patient reports sent with elevated liver enzymes nausea vomiting and diarrhea    Review of Systems   All systems were reviewed and negative except for: Reviewed    Objective   Objective     Vitals:   Temp:  [97.9 °F (36.6 °C)-100.5 °F (38.1 °C)] 97.9 °F (36.6 °C)  Heart Rate:  [44-56] 53  Resp:  [17-18] 17  BP: (150-175)/(58-90) 175/58    Physical Exam    Constitutional: Awake, alert   Eyes: PERRLA, sclerae anicteric, no conjunctival injection   HENT: NCAT, mucous membranes moist   Neck: Supple, no thyromegaly, no lymphadenopathy, trachea midline   Respiratory: Clear to auscultation bilaterally, nonlabored respirations    Cardiovascular: RRR, no murmurs, rubs, or gallops, palpable pedal pulses bilaterally   Gastrointestinal: Positive bowel sounds, soft, nontender, nondistended   Musculoskeletal: No bilateral ankle edema, no clubbing or cyanosis to extremities   Psychiatric: Appropriate affect, cooperative   Neurologic: Oriented x 3, strength symmetric in all extremities, Cranial Nerves grossly intact to confrontation, speech clear   Skin: No rashes   No new findings  Result Review    Result Review:  I have personally reviewed the results from the time of this admission to 2022 11:34 EDT and agree with these findings:  [x]  Laboratory  elevatedliver enzyme  []  Microbiology  []  Radiology  []  EKG/Telemetry   []  Cardiology/Vascular   []  Pathology  []  Old records  []  Other:  Most notable findings include: Abnormal liver function tests with history of  bradycardia    Assessment & Plan   Assessment / Plan     Brief Patient Summary:  Cristina Pina is a 86 y.o. female who patient admitted with bradycardia and elevated liver enzyme    Active Hospital Problems:  Active Hospital Problems    Diagnosis    • Symptomatic bradycardia        Plan:   GI and cardiology consultations    DVT prophylaxis:  Mechanical DVT prophylaxis orders are present.    CODE STATUS:   Level Of Support Discussed With: Patient  Code Status (Patient has no pulse and is not breathing): CPR (Attempt to Resuscitate)  Medical Interventions (Patient has pulse or is breathing): Full Support    Disposition:  I expect patient to be discharged when the patient is stable.    Electronically signed by Felice Guillermo MD, 08/12/22, 11:34 AM EDT.

## 2022-08-12 NOTE — CONSULTS
[facility]   Consult Note    Patient Name: Cristina Pina  : 1935  MRN: [id number]  Primary Care Physician:  Michael Serrano MD  Referring Physician: No ref. provider found  Date of admission: 2022    Subjective   Subjective     Reason for Consult/ Chief Complaint: Elevated liver enzymes    HPI:  Cristina Pina is a 86 y.o. female Who presented to the emergency room on 2022 with chief complaint of nausea, vomiting and diarrhea.  Patient stated that she has been having some dental work done for the past several months and has been unable to eat significant amount.  About 5 days ago she developed nausea, vomiting and diarrhea.  The diarrhea was loose and watery without any hematochezia or melena.  She reported no abdominal pain.  She felt generally extremely weak and fatigued and presented initially to the urgent care and subsequently to the emergency room.  There were no report of fever.  Although she apparently reported abdominal discomfort in the emergency room she reported none to me today.    CT abdomen pelvis with contrast showed previous cholecystectomy without any biliary ductal dilation.  There were other incidental findings but no evidence of any significant pathology.    Her AST was noted to be 1347 and ALT was 2362.  Today the LFTs have improved with the AST at 879 and ALT at 1917.  The rest of the liver panel is normal.    Patient reports no history of any Tylenol use.  No history of any hepatitis or jaundice or any new medications.    Patient also had episode of bradycardia on admission and had some electrolyte disturbances with hypokalemia.    Review of Systems     All systems were reviewed and negative except for: GI complaints as described.  She has some arthritis.  There is also history of dysuria and generalized weakness.      Personal History     Past Medical History:   Diagnosis Date   • Hypertension        Past Surgical History:   Procedure Laterality Date   •  CARDIAC SURGERY     • CHOLECYSTECTOMY     • EYE SURGERY     • HYSTERECTOMY     • KNEE CLOSED REDUCTION         Family History: family history is not on file. Otherwise pertinent FHx was reviewed and not pertinent to current issue.    Social History:  reports that she has never smoked. She has never used smokeless tobacco. She reports previous alcohol use. She reports that she does not use drugs.    Home Medications:  Omega-3, PARoxetine, albuterol sulfate HFA, aspirin, levothyroxine, multivitamin with minerals, oxybutynin XL, and simvastatin    Allergies:  Allergies   Allergen Reactions   • Ciprofibrate Hives   • Sulfa Antibiotics Hives       Objective    Objective     Vitals:   Temp:  [97.9 °F (36.6 °C)-100.5 °F (38.1 °C)] 98.4 °F (36.9 °C)  Heart Rate:  [49-56] 53  Resp:  [16-18] 16  BP: (156-180)/(58-90) 180/61    Physical Exam:   Constitutional: Awake, alert   Eyes: PERRLA, sclerae anicteric, no conjunctival injection   HENT: NCAT, mucous membranes moist   Neck: Supple, no thyromegaly, no lymphadenopathy, trachea midline   Respiratory: Clear to auscultation bilaterally, nonlabored respirations    Cardiovascular: RRR, no murmurs, rubs, or gallops, palpable pedal pulses bilaterally   Gastrointestinal: Positive bowel sounds, soft, nontender, nondistended   Musculoskeletal: No bilateral ankle edema, no clubbing or cyanosis to extremities   Psychiatric: Appropriate affect, cooperative   Neurologic: Oriented x 3, strength symmetric in all extremities, Cranial Nerves grossly intact to confrontation, speech clear   Skin: No rashes     Scheduled Meds:aspirin, 325 mg, Oral, Daily  cefTRIAXone, 1 g, Intravenous, Q24H  levothyroxine, 125 mcg, Oral, Q AM  losartan, 25 mg, Oral, BID  multivitamin with minerals, 1 tablet, Oral, Daily  oxybutynin XL, 5 mg, Oral, Daily  PARoxetine, 20 mg, Oral, QAM  potassium chloride, 20 mEq, Oral, BID With Meals  potassium chloride, 10 mEq, Intravenous, Q1H      Continuous Infusions:   PRN  Meds:.•  acetaminophen  •  albuterol sulfate HFA  •  aluminum-magnesium hydroxide-simethicone  •  nitroglycerin  •  ondansetron  •  sodium chloride      Result Review    Result Review:  I have personally reviewed the results from the time of this admission to 8/12/2022 13:24 EDT and agree with these findings:    Lab Results (last 24 hours)     Procedure Component Value Units Date/Time    Urine Culture - Urine, Urine, Catheter In/Out [[id number]]  (Abnormal) Collected: 08/11/22 1320    Specimen: Urine, Catheter In/Out Updated: 08/12/22 1214     Urine Culture >100,000 CFU/mL Escherichia coli    Narrative:      Colonization of the urinary tract without infection is common. Treatment is discouraged unless the patient is symptomatic, pregnant, or undergoing an invasive urologic procedure.    COVID PRE-OP / PRE-PROCEDURE SCREENING ORDER (NO ISOLATION) - Swab, Nasopharynx [[id number]] Collected: 08/12/22 1042    Specimen: Swab from Nasopharynx Updated: 08/12/22 1050    Narrative:      The following orders were created for panel order COVID PRE-OP / PRE-PROCEDURE SCREENING ORDER (NO ISOLATION) - Swab, Nasopharynx.  Procedure                               Abnormality         Status                     ---------                               -----------         ------                     COVID-19,APTIMA PANTHER(...[[id number]]                      In process                   Please view results for these tests on the individual orders.    COVID-19,APTIMA PANTHER(CON),[facility]/ ARIELLE, NP/OP SWAB IN UTM/VTM/SALINE TRANSPORT MEDIA,24 HR TAT - Swab, Nasopharynx [[id number]] Collected: 08/12/22 1042    Specimen: Swab from Nasopharynx Updated: 08/12/22 1050    Vitamin B12 [[id number]]  (Abnormal) Collected: 08/12/22 0455    Specimen: Blood Updated: 08/12/22 1022     Vitamin B-12 >2,000 pg/mL     Narrative:      Results may be falsely increased if patient taking Biotin.      Amylase [[id number]]  (Normal) Collected: 08/12/22 0455     Specimen: Blood Updated: 08/12/22 0855     Amylase 42 U/L     Comprehensive Metabolic Panel [[id number]]  (Abnormal) Collected: 08/12/22 0455    Specimen: Blood Updated: 08/12/22 0551     Glucose 71 mg/dL      BUN 16 mg/dL      Creatinine 0.59 mg/dL      Sodium 144 mmol/L      Potassium 2.8 mmol/L      Chloride 104 mmol/L      CO2 23.5 mmol/L      Calcium 8.5 mg/dL      Total Protein 6.2 g/dL      Albumin 3.80 g/dL      ALT (SGPT) 1,917 U/L      AST (SGOT) 879 U/L      Alkaline Phosphatase 71 U/L      Total Bilirubin 1.0 mg/dL      Globulin 2.4 gm/dL      A/G Ratio 1.6 g/dL      BUN/Creatinine Ratio 27.1     Anion Gap 16.5 mmol/L      eGFR 89.0 mL/min/1.73      Comment: National Kidney Foundation and American Society of Nephrology (ASN) Task Force recommended calculation based on the Chronic Kidney Disease Epidemiology Collaboration (CKD-EPI) equation refit without adjustment for race.       Narrative:      GFR Normal >60  Chronic Kidney Disease <60  Kidney Failure <15      TSH [[id number]]  (Normal) Collected: 08/12/22 0455    Specimen: Blood Updated: 08/12/22 0541     TSH 3.950 uIU/mL     Iron Profile [[id number]]  (Normal) Collected: 08/12/22 0455    Specimen: Blood Updated: 08/12/22 0540     Iron 71 mcg/dL      Iron Saturation 23 %      Transferrin 211 mg/dL      TIBC 314 mcg/dL     Protime-INR [[id number]]  (Abnormal) Collected: 08/12/22 0455    Specimen: Blood Updated: 08/12/22 0522     Protime 15.4 Seconds      INR 1.20    Narrative:      Suggested Therapeutic Ranges For Oral Anticoagulant Therapy:  Level of Therapy                      INR Target Range  Standard Dose                            2.0-3.0  High Dose                                2.5-3.5  Patients not receiving anticoagulant  Therapy Normal Range                     0.86-1.15    CBC & Differential [[id number]]  (Abnormal) Collected: 08/12/22 0455    Specimen: Blood Updated: 08/12/22 0515    Narrative:      The following orders were created for panel  order CBC & Differential.  Procedure                               Abnormality         Status                     ---------                               -----------         ------                     CBC Auto Differential[[id number]]        Abnormal            Final result                 Please view results for these tests on the individual orders.    CBC Auto Differential [[id number]]  (Abnormal) Collected: 08/12/22 0455    Specimen: Blood Updated: 08/12/22 0515     WBC 6.95 10*3/mm3      RBC 3.83 10*6/mm3      Hemoglobin 12.2 g/dL      Hematocrit 35.4 %      MCV 92.4 fL      MCH 31.9 pg      MCHC 34.5 g/dL      RDW 12.9 %      RDW-SD 43.4 fl      MPV 9.6 fL      Platelets 219 10*3/mm3      Neutrophil % 67.2 %      Lymphocyte % 21.2 %      Monocyte % 6.5 %      Eosinophil % 3.9 %      Basophil % 0.6 %      Immature Grans % 0.6 %      Neutrophils, Absolute 4.68 10*3/mm3      Lymphocytes, Absolute 1.47 10*3/mm3      Monocytes, Absolute 0.45 10*3/mm3      Eosinophils, Absolute 0.27 10*3/mm3      Basophils, Absolute 0.04 10*3/mm3      Immature Grans, Absolute 0.04 10*3/mm3      nRBC 0.0 /100 WBC     Mycoplasma Pneu. IgG / IgM Antibodies [[id number]] Collected: 08/12/22 0455    Specimen: Blood Updated: 08/12/22 0508    POC Troponin I with Hold Tube [[id number]] Collected: 08/11/22 1251    Specimen: Blood Updated: 08/12/22 0005    Narrative:      The following orders were created for panel order POC Troponin I with Hold Tube.  Procedure                               Abnormality         Status                     ---------                               -----------         ------                     POC Troponin I[[id number]]                                                              HOLD Troponin-I Tube[[id number]]                                                          Please view results for these tests on the individual orders.    TSH [[id number]]  (Normal) Collected: 08/11/22 2027    Specimen: Blood Updated: 08/11/22  2215     TSH 3.380 uIU/mL     Comprehensive Metabolic Panel [[id number]]  (Abnormal) Collected: 08/11/22 2027    Specimen: Blood Updated: 08/11/22 2121     Glucose 77 mg/dL      BUN 17 mg/dL      Creatinine 0.64 mg/dL      Sodium 144 mmol/L      Potassium 2.6 mmol/L      Chloride 103 mmol/L      CO2 24.7 mmol/L      Calcium 8.9 mg/dL      Total Protein 6.5 g/dL      Albumin 4.20 g/dL      ALT (SGPT) 2,362 U/L      AST (SGOT) 1,347 U/L      Alkaline Phosphatase 76 U/L      Total Bilirubin 1.1 mg/dL      Globulin 2.3 gm/dL      A/G Ratio 1.8 g/dL      BUN/Creatinine Ratio 26.6     Anion Gap 16.3 mmol/L      eGFR 87.3 mL/min/1.73      Comment: National Kidney Foundation and American Society of Nephrology (ASN) Task Force recommended calculation based on the Chronic Kidney Disease Epidemiology Collaboration (CKD-EPI) equation refit without adjustment for race.       Narrative:      GFR Normal >60  Chronic Kidney Disease <60  Kidney Failure <15      T4, Free [[id number]]  (Normal) Collected: 08/11/22 2027    Specimen: Blood Updated: 08/11/22 2105     Free T4 1.20 ng/dL     Narrative:      Results may be falsely increased if patient taking Biotin.      CBC (No Diff) [[id number]]  (Normal) Collected: 08/11/22 2027    Specimen: Blood Updated: 08/11/22 2038     WBC 7.23 10*3/mm3      RBC 4.06 10*6/mm3      Hemoglobin 13.2 g/dL      Hematocrit 37.3 %      MCV 91.9 fL      MCH 32.5 pg      MCHC 35.4 g/dL      RDW 13.2 %      RDW-SD 44.2 fl      MPV 10.0 fL      Platelets 222 10*3/mm3     Blood Culture - Blood, Arm, Left [[id number]] Collected: 08/11/22 2028    Specimen: Blood from Arm, Left Updated: 08/11/22 2037    Blood Culture - Blood, Hand, Left [[id number]] Collected: 08/11/22 2028    Specimen: Blood from Hand, Left Updated: 08/11/22 2037    Systemic Lupus Profile A [[id number]] Collected: 08/11/22 2027    Specimen: Blood Updated: 08/11/22 2035    Hepatitis Panel, Acute [[id number]] Collected: 08/11/22 2027    Specimen: Blood  Updated: 08/11/22 2035    Urinalysis With Culture If Indicated - Urine, Catheter In/Out [[id number]]  (Abnormal) Collected: 08/11/22 1320    Specimen: Urine, Catheter In/Out Updated: 08/11/22 1353     Color, UA Yellow     Appearance, UA Turbid     pH, UA 6.0     Specific Gravity, UA 1.016     Glucose, UA Negative     Ketones, UA 80 mg/dL (3+)     Bilirubin, UA Negative     Blood, UA Moderate (2+)     Protein, UA 30 mg/dL (1+)     Leuk Esterase, UA Large (3+)     Nitrite, UA Negative     Urobilinogen, UA 1.0 E.U./dL    Narrative:      In absence of clinical symptoms, the presence of pyuria, bacteria, and/or nitrites on the urinalysis result does not correlate with infection.    Urinalysis, Microscopic Only - Urine, Catheter In/Out [[id number]]  (Abnormal) Collected: 08/11/22 1320    Specimen: Urine, Catheter In/Out Updated: 08/11/22 1353     RBC, UA 3-5 /HPF      WBC, UA 13-20 /HPF      Bacteria, UA None Seen /HPF      Squamous Epithelial Cells, UA 0-2 /HPF      Hyaline Casts, UA None Seen /LPF      Methodology Manual Light Microscopy             No results found for: NOCARDIACX, STOOLCX      Imaging Results (Last 24 Hours)     Procedure Component Value Units Date/Time    CT Abdomen Pelvis With Contrast [[id number]] Collected: 08/11/22 1357     Updated: 08/11/22 1400    Narrative:      PROCEDURE: CT ABDOMEN PELVIS W CONTRAST     COMPARISON: None     INDICATIONS: r/o diverticulitis,LLQ PAIN,NAUSEA,LOSS OF APPITITE X 4 DAYS     TECHNIQUE: After obtaining the patient's consent, CT images were created with non-ionic intravenous   contrast material.       PROTOCOL:   Standard imaging protocol performed      RADIATION:   DLP: 994.4mGy*cm    Automated exposure control was utilized to minimize radiation dose.   CONTRAST: 100cc Isovue 370 I.V.     FINDINGS:   There is bronchiectasis and some linear scarring in lung bases.  There is a very large hiatal   hernia.  There are coronary artery calcifications.  There is diffuse hepatic  steatosis.  Liver size   is normal.  There is a hypodense lesion in the lateral segment of the left lobe of the liver   consistent with a cyst.  The spleen is normal.  The adrenal glands and the pancreas are normal.    The gallbladder is surgically absent.  There is no significant biliary dilatation.  There is a   simple cyst in the right kidney.  The left kidney is normal.  There are no dilated or thickened   loops of bowel in the upper abdomen.  There is sigmoid diverticulosis but no CT evidence of   diverticulitis.  The uterus is absent.  Small amount of air is present within the urinary bladder.    The bladder is largely decompressed with mild bladder wall thickening.  There are changes of   degenerative disc disease in the lumbar spine, most marked at the L4-5 level.       Impression:         1. No acute findings in the abdomen or pelvis.  2. Cholecystectomy and hysterectomy  3. Sigmoid diverticulosis without evidence of diverticulitis  4. Decompressed urinary bladder with mild bladder wall thickening.  There is a small amount of air   within the urinary bladder.  5. Incidental hepatic and renal cysts.  6. Large hiatal hernia.    7. Coronary artery calcifications  8. Diffuse hepatic steatosis            Harrison Montano MD         Electronically Signed and Approved By: Harrison Montano MD on 8/11/2022 at 13:56                             Assessment & Plan   Assessment / Plan     Brief Patient Summary:  Cristina Pina is a 86 y.o. female who has a following assessment    #1 elevated liver test.    This is significant elevation of liver transaminases which are improving.  The pattern of LFT elevation and lack of any biliary ductal dilation or any abdominal symptoms at this time and with her clinical history of bradycardia, nausea vomiting and diarrhea makes this likely to be an episode of ischemic liver injury.  These are improving and I would expect this to gradually normalize in the next several days.    #2 history  of cholecystectomy    No evidence of any biliary ductal dilation.  Do not suspect this to be biliary or pancreatic pathology at this time.    #3 bradycardia    As per cardiology    #4 hypokalemia    Treatment as per primary team.    Active Hospital Problems:  Active Hospital Problems    Diagnosis    • Symptomatic bradycardia      Plan:     #1 monitor LFTs.  Follow liver enzymes daily.    #2 correct hypokalemia and other electrolyte abnormalities.    #3 avoid other hepatotoxic drugs    #4 no further GI evaluation is planned at this time.  Patient can follow-up with her primary care physician or gastroenterology as an outpatient.      Electronically signed by Reyes De La Garza MD, 08/12/22, 1:24 PM EDT.

## 2022-08-12 NOTE — PLAN OF CARE
Problem: Adult Inpatient Plan of Care  Goal: Plan of Care Review  Outcome: Ongoing, Progressing  Goal: Patient-Specific Goal (Individualized)  Outcome: Ongoing, Progressing  Goal: Absence of Hospital-Acquired Illness or Injury  Outcome: Ongoing, Progressing  Intervention: Identify and Manage Fall Risk  Intervention: Prevent Skin Injury  Intervention: Prevent and Manage VTE (Venous Thromboembolism) Risk  Goal: Optimal Comfort and Wellbeing  Outcome: Ongoing, Progressing  Goal: Readiness for Transition of Care  Outcome: Ongoing, Progressing     Problem: Fall Injury Risk  Goal: Absence of Fall and Fall-Related Injury  Outcome: Ongoing, Progressing  Intervention: Promote Injury-Free Environment   Goal Outcome Evaluation:

## 2022-08-12 NOTE — CONSULTS
Cardiology Consult Note  [facility] CARE UNIT          Patient Identification:  Cristina Pina      [id number]  86 y.o.        female  1935         Reason for Consultation: Bradycardia    PCP: Aidan Serrano MD    History of Present Illness:     Patient is a 86-year-old female came for evaluation of feeling weak and tired.  She is also having nausea vomiting as well as diarrhea for the last 1 week.  No chest pain or shortness of breath.  She is known to have coronary artery had a stent placed in [place] but does not follow a cardiologist.  She was found to have very low potassium of 2.4 but normal creatinine.  Her heart rate has been between 45 and 50 but she claims she always has bradycardia    Past History:  Past Medical History:   Diagnosis Date   • Hypertension      Past Surgical History:   Procedure Laterality Date   • CARDIAC SURGERY     • CHOLECYSTECTOMY     • EYE SURGERY     • HYSTERECTOMY     • KNEE CLOSED REDUCTION       Allergies   Allergen Reactions   • Ciprofibrate Hives   • Sulfa Antibiotics Hives     Social History     Socioeconomic History   • Marital status:    Tobacco Use   • Smoking status: Never Smoker   • Smokeless tobacco: Never Used   Substance and Sexual Activity   • Alcohol use: Not Currently   • Drug use: Never   • Sexual activity: Defer     History reviewed. No pertinent family history.      Medications:  Prior to Admission medications    Medication Sig Start Date End Date Taking? Authorizing Provider   aspirin 325 MG tablet Take 325 mg by mouth Daily.   Yes Emergency, Nurse MARIELLA Shin   levothyroxine (SYNTHROID, LEVOTHROID) 125 MCG tablet Take 125 mcg by mouth Daily.   Yes Emergency, Nurse MARIELLA Shin   multivitamin with minerals tablet tablet Take 1 tablet by mouth Daily.   Yes Emergency, Nurse MARIELLA Shin   Omega-3 1000 MG capsule Take 1 capsule by mouth Daily.   Yes Emergency, Nurse Aleida RN   oxybutynin XL (DITROPAN-XL) 5 MG 24 hr tablet Take 5 mg by mouth  "Daily.   Yes Emergency, Nurse Epic, RN   PARoxetine (PAXIL) 20 MG tablet Take 20 mg by mouth Every Morning.   Yes Emergency, Nurse Aleida RN   simvastatin (ZOCOR) 20 MG tablet Take 20 mg by mouth Daily.   Yes Emergency, Nurse Aleida, RN   albuterol sulfate  (90 Base) MCG/ACT inhaler Inhale 2 puffs Every 4 (Four) Hours As Needed for Wheezing.    Emergency, Nurse Epic, RN      Current medications:  aspirin, 325 mg, Oral, Daily  cefTRIAXone, 1 g, Intravenous, Q24H  levothyroxine, 125 mcg, Oral, Q AM  losartan, 25 mg, Oral, BID  multivitamin with minerals, 1 tablet, Oral, Daily  oxybutynin XL, 5 mg, Oral, Daily  PARoxetine, 20 mg, Oral, QAM  potassium chloride, 20 mEq, Oral, BID With Meals  potassium chloride, 10 mEq, Intravenous, Q1H      Current IV drips:         Review of Systems  Review of Systems   Constitutional: Negative for appetite change, fatigue and fever.   HENT: Negative for congestion.    Respiratory: Negative for apnea, choking, chest tightness, shortness of breath and wheezing.    Cardiovascular: Negative for chest pain, palpitations and leg swelling.   Gastrointestinal: Negative for diarrhea, nausea and vomiting.   Genitourinary: Negative for dysuria, frequency and hematuria.   Musculoskeletal: Positive for arthralgias. Negative for myalgias.   Skin: Negative for pallor.   Neurological: Negative for dizziness, tremors, syncope and weakness.   Psychiatric/Behavioral: Negative for agitation and confusion.         Physical Exam    /58 (BP Location: Right arm, Patient Position: Lying)   Pulse 53   Temp 97.9 °F (36.6 °C) (Oral)   Resp 17   Ht 170.2 cm (67\")   Wt 101 kg (221 lb 9 oz)   SpO2 95%   BMI 34.70 kg/m²  Body mass index is 34.7 kg/m².   Oxygen saturation   @FLOWAN(10::1)@ SpO2  Min: 93 %  Max: 98 %    General Appearance:   · no acute distress  · Alert and oriented x3  HENT:   · lips not cyanotic  · Atraumatic  Neck:  · thyroid not enlarged  · supple  Respiratory:  · no respiratory " distress  · normal breath sounds  · no rales  Cardiovascular:  · no jugular venous distention  · regular rhythm  · apical impulse normal  · S1 normal, S2 normal  · no S3, no S4   · no murmur  · no rub, no thrill  · no carotid bruit  · pedal pulses normal  · lower extremity edema: none    Gastrointestinal:   · bowel sounds normal  · non-tender  · no hepatomegaly, no splenomegaly  Musculoskeletal:  · no clubbing of fingers.   · normocephalic, head atraumatic  Skin:   · warm, dry  · no rashes  Neuro/Psychiatric:  · normal mood and affect  · judgement and insight appropriate      Cardiographics:     ECG  (personally reviewed) EKG: Sinus rhythm right bundle branch block first-degree AV block minor ST-T changes   Telemetry:  (personally reviewed) sinus rhythm          No results found for this or any previous visit.      Cardiolite (Tc-99m Sestamibi) stress test   Lab Review:       CBC    CBC 8/11/22 8/11/22 8/12/22 1117 2027    WBC 7.10 7.23 6.95   RBC 4.31 4.06 3.83   Hemoglobin 14.0 13.2 12.2   Hematocrit 40.1 37.3 35.4   MCV 93.0 91.9 92.4   MCH 32.5 32.5 31.9   MCHC 34.9 35.4 34.5   RDW 13.1 13.2 12.9   Platelets 234 222 219               CMP    CMP 8/11/22 8/11/22 8/12/22 1117 2027    Glucose 86 77 71   BUN 20 17 16   Creatinine 0.76 0.64 0.59   Sodium 143 144 144   Potassium 2.6 (A) 2.6 (A) 2.8 (A)   Chloride 105 103 104   Calcium 9.3 8.9 8.5 (A)   Albumin 4.40 4.20 3.80   Total Bilirubin 1.3 (A) 1.1 1.0   Alkaline Phosphatase 82 76 71   AST (SGOT) 2,081 (A) 1,347 (A) 879 (A)   ALT (SGPT) 2,951 (A) 2,362 (A) 1,917 (A)   (A) Abnormal value               CARDIAC LABS:      Lab 08/12/22  0455   PROTIME 15.4*   INR 1.20*      No results found for: DIGOXIN   Lab Results   Component Value Date    TSH 3.950 08/12/2022           Invalid input(s): LDLCALC  Lab Results   Component Value Date    POCTROP 0.06 08/11/2022     No components found for: DDIMERQUAN  Lab Results   Component Value Date    MG 2.0 08/11/2022                  Assessment:  Mild sinus bradycardia chronic  Nausea vomiting and diarrhea of unknown etiology  Severe hypokalemia  Coronary artery disease with previous stent placement  Hypertension        Plan:  Patient has mild bradycardia but no symptoms.  We will just observe her.  Some of it could be due to hypokalemia  Correct the hypokalemia aggressively  COVID to rule out  Blood pressure is high.  Start her on a small dose of losartan      Thank you for allowing me to share in [facility].        Lennox Lawson MD   8/12/2022    10:41 EDT

## 2022-08-13 LAB
ALBUMIN SERPL-MCNC: 3.9 G/DL (ref 3.5–5.2)
ALBUMIN/GLOB SERPL: 1.4 G/DL
ALP SERPL-CCNC: 78 U/L (ref 39–117)
ALT SERPL W P-5'-P-CCNC: 1247 U/L (ref 1–33)
ANION GAP SERPL CALCULATED.3IONS-SCNC: 14.5 MMOL/L (ref 5–15)
AST SERPL-CCNC: 331 U/L (ref 1–32)
BACTERIA SPEC AEROBE CULT: ABNORMAL
BASOPHILS # BLD AUTO: 0.03 10*3/MM3 (ref 0–0.2)
BASOPHILS NFR BLD AUTO: 0.5 % (ref 0–1.5)
BILIRUB SERPL-MCNC: 0.7 MG/DL (ref 0–1.2)
BUN SERPL-MCNC: 12 MG/DL (ref 8–23)
BUN/CREAT SERPL: 18.8 (ref 7–25)
CALCIUM SPEC-SCNC: 9.1 MG/DL (ref 8.6–10.5)
CHLORIDE SERPL-SCNC: 102 MMOL/L (ref 98–107)
CO2 SERPL-SCNC: 26.5 MMOL/L (ref 22–29)
CREAT SERPL-MCNC: 0.64 MG/DL (ref 0.57–1)
DEPRECATED RDW RBC AUTO: 45.2 FL (ref 37–54)
EGFRCR SERPLBLD CKD-EPI 2021: 87.3 ML/MIN/1.73
EOSINOPHIL # BLD AUTO: 0.21 10*3/MM3 (ref 0–0.4)
EOSINOPHIL NFR BLD AUTO: 3.3 % (ref 0.3–6.2)
ERYTHROCYTE [DISTWIDTH] IN BLOOD BY AUTOMATED COUNT: 13.3 % (ref 12.3–15.4)
GLOBULIN UR ELPH-MCNC: 2.8 GM/DL
GLUCOSE SERPL-MCNC: 151 MG/DL (ref 65–99)
HCT VFR BLD AUTO: 40.3 % (ref 34–46.6)
HGB BLD-MCNC: 14 G/DL (ref 12–15.9)
IMM GRANULOCYTES # BLD AUTO: 0.05 10*3/MM3 (ref 0–0.05)
IMM GRANULOCYTES NFR BLD AUTO: 0.8 % (ref 0–0.5)
LYMPHOCYTES # BLD AUTO: 1.32 10*3/MM3 (ref 0.7–3.1)
LYMPHOCYTES NFR BLD AUTO: 20.5 % (ref 19.6–45.3)
MAGNESIUM SERPL-MCNC: 1.7 MG/DL (ref 1.6–2.4)
MCH RBC QN AUTO: 32.6 PG (ref 26.6–33)
MCHC RBC AUTO-ENTMCNC: 34.7 G/DL (ref 31.5–35.7)
MCV RBC AUTO: 93.7 FL (ref 79–97)
MONOCYTES # BLD AUTO: 0.38 10*3/MM3 (ref 0.1–0.9)
MONOCYTES NFR BLD AUTO: 5.9 % (ref 5–12)
NEUTROPHILS NFR BLD AUTO: 4.46 10*3/MM3 (ref 1.7–7)
NEUTROPHILS NFR BLD AUTO: 69 % (ref 42.7–76)
NRBC BLD AUTO-RTO: 0 /100 WBC (ref 0–0.2)
NT-PROBNP SERPL-MCNC: 183.8 PG/ML (ref 0–1800)
PLATELET # BLD AUTO: 231 10*3/MM3 (ref 140–450)
PMV BLD AUTO: 10 FL (ref 6–12)
POTASSIUM SERPL-SCNC: 3.1 MMOL/L (ref 3.5–5.2)
PROT SERPL-MCNC: 6.7 G/DL (ref 6–8.5)
RBC # BLD AUTO: 4.3 10*6/MM3 (ref 3.77–5.28)
SODIUM SERPL-SCNC: 143 MMOL/L (ref 136–145)
TROPONIN T SERPL-MCNC: <0.01 NG/ML (ref 0–0.03)
TROPONIN T SERPL-MCNC: <0.01 NG/ML (ref 0–0.03)
WBC NRBC COR # BLD: 6.45 10*3/MM3 (ref 3.4–10.8)

## 2022-08-13 PROCEDURE — 83880 ASSAY OF NATRIURETIC PEPTIDE: CPT | Performed by: INTERNAL MEDICINE

## 2022-08-13 PROCEDURE — 80053 COMPREHEN METABOLIC PANEL: CPT | Performed by: INTERNAL MEDICINE

## 2022-08-13 PROCEDURE — 25010000002 CEFTRIAXONE PER 250 MG: Performed by: INTERNAL MEDICINE

## 2022-08-13 PROCEDURE — 85025 COMPLETE CBC W/AUTO DIFF WBC: CPT | Performed by: INTERNAL MEDICINE

## 2022-08-13 PROCEDURE — 84484 ASSAY OF TROPONIN QUANT: CPT | Performed by: INTERNAL MEDICINE

## 2022-08-13 PROCEDURE — 83735 ASSAY OF MAGNESIUM: CPT | Performed by: INTERNAL MEDICINE

## 2022-08-13 PROCEDURE — 0 POTASSIUM CHLORIDE 10 MEQ/100ML SOLUTION: Performed by: INTERNAL MEDICINE

## 2022-08-13 PROCEDURE — 94799 UNLISTED PULMONARY SVC/PX: CPT

## 2022-08-13 RX ORDER — LEVOFLOXACIN 250 MG/1
250 TABLET ORAL EVERY 24 HOURS
Status: DISCONTINUED | OUTPATIENT
Start: 2022-08-13 | End: 2022-08-14 | Stop reason: HOSPADM

## 2022-08-13 RX ORDER — POTASSIUM CHLORIDE 750 MG/1
40 CAPSULE, EXTENDED RELEASE ORAL ONCE
Status: COMPLETED | OUTPATIENT
Start: 2022-08-13 | End: 2022-08-13

## 2022-08-13 RX ORDER — POTASSIUM CHLORIDE 7.45 MG/ML
10 INJECTION INTRAVENOUS
Status: DISCONTINUED | OUTPATIENT
Start: 2022-08-13 | End: 2022-08-13

## 2022-08-13 RX ADMIN — MULTIPLE VITAMINS W/ MINERALS TAB 1 TABLET: TAB at 08:17

## 2022-08-13 RX ADMIN — OXYBUTYNIN CHLORIDE 5 MG: 5 TABLET, EXTENDED RELEASE ORAL at 08:17

## 2022-08-13 RX ADMIN — POTASSIUM CHLORIDE 20 MEQ: 750 CAPSULE, EXTENDED RELEASE ORAL at 08:17

## 2022-08-13 RX ADMIN — POTASSIUM CHLORIDE 10 MEQ: 7.46 INJECTION, SOLUTION INTRAVENOUS at 15:51

## 2022-08-13 RX ADMIN — LEVOTHYROXINE SODIUM 125 MCG: 125 TABLET ORAL at 06:04

## 2022-08-13 RX ADMIN — POTASSIUM CHLORIDE 40 MEQ: 750 CAPSULE, EXTENDED RELEASE ORAL at 17:59

## 2022-08-13 RX ADMIN — PAROXETINE HYDROCHLORIDE 20 MG: 20 TABLET, FILM COATED ORAL at 06:04

## 2022-08-13 RX ADMIN — LOSARTAN POTASSIUM 25 MG: 25 TABLET, FILM COATED ORAL at 20:06

## 2022-08-13 RX ADMIN — Medication 10 ML: at 08:17

## 2022-08-13 RX ADMIN — ASPIRIN 325 MG ORAL TABLET 325 MG: 325 PILL ORAL at 08:17

## 2022-08-13 RX ADMIN — CEFTRIAXONE SODIUM 1 G: 1 INJECTION, SOLUTION INTRAVENOUS at 08:16

## 2022-08-13 RX ADMIN — LEVOFLOXACIN 250 MG: 250 TABLET, FILM COATED ORAL at 20:06

## 2022-08-13 RX ADMIN — LOSARTAN POTASSIUM 25 MG: 25 TABLET, FILM COATED ORAL at 08:17

## 2022-08-13 NOTE — PROGRESS NOTES
[facility]     Progress Note    Patient Name: Cristina Pina  : 1935  MRN: [id number]  Primary Care Physician:  Michael Serrano MD  Date of admission: 2022    Subjective  Patient was admitted with low-grade fever of 101 yesterday evidence of urinary tract infection LFTs were highly elevated which now improving almost coming back to normal she also has had bradycardia which patient stated has been chronic seen by cardiology and follow-up will be done as an outpatient at this time patient still has got hypokalemia for the potassium supplements will be given today  Subjective     Chief Complaint: Patient will be getting more potassium supplements liver functions will be checked again tomorrow    HPI:  Patient reports patient will be getting the liver functions repeated are improving continue on Rocephin    Review of Systems   All systems were reviewed and negative except for: Reviewed    Objective   Objective     Vitals:   Temp:  [97.7 °F (36.5 °C)-101 °F (38.3 °C)] 97.7 °F (36.5 °C)  Heart Rate:  [45-54] 50  Resp:  [14-18] 16  BP: (135-184)/(62-94) 145/70    Physical Exam    Constitutional: Awake, alert   Eyes: PERRLA, sclerae anicteric, no conjunctival injection   HENT: NCAT, mucous membranes moist   Neck: Supple, no thyromegaly, no lymphadenopathy, trachea midline   Respiratory: Clear to auscultation bilaterally, nonlabored respirations    Cardiovascular: RRR, no murmurs, rubs, or gallops, palpable pedal pulses bilaterally   Gastrointestinal: Positive bowel sounds, soft, nontender, nondistended   Musculoskeletal: No bilateral ankle edema, no clubbing or cyanosis to extremities   Psychiatric: Appropriate affect, cooperative   Neurologic: Oriented x 3, strength symmetric in all extremities, Cranial Nerves grossly intact to confrontation, speech clear   Skin: No rashes   Change  Result Review    Result Review:  I have personally reviewed the results from the time of this admission to 2022  14:36 EDT and agree with these findings:  [x]  Laboratory elevated LFTs  [x]  Microbiology urinary tract infection  []  Radiology  []  EKG/Telemetry   []  Cardiology/Vascular   []  Pathology  []  Old records  []  Other:  Most notable findings include: With urinary tract infection elevated LFTs and bradycardia stabilized    Assessment & Plan   Assessment / Plan     Brief Patient Summary:  Cristina Pina is a 86 y.o. female who persistent hypokalemia more potassium supplements will be given today    Active Hospital Problems:  Active Hospital Problems    Diagnosis    • Symptomatic bradycardia        Plan:   IV potassium continue antibiotics    DVT prophylaxis:  Mechanical DVT prophylaxis orders are present.    CODE STATUS:   Level Of Support Discussed With: Patient  Code Status (Patient has no pulse and is not breathing): CPR (Attempt to Resuscitate)  Medical Interventions (Patient has pulse or is breathing): Full Support    Disposition:  I expect patient to be discharged hopefully by tomorrow if patient remains afebrile and if the potassium levels have been normalized.    Electronically signed by Felice Guillermo MD, 08/13/22, 2:36 PM EDT.

## 2022-08-13 NOTE — PLAN OF CARE
Goal Outcome Evaluation:   VSS. Potassium still low. No changes. Will continue to monitor.

## 2022-08-13 NOTE — PROGRESS NOTES
Gastroenterology  Inpatient Progress Note    Cristina Pina  1935 8/13/2022    Subjective   Subjective     HPI:  Cristina Pina is a 86 y.o. female Admitted with nausea, vomiting and diarrhea.  GI consultation obtained because of elevated LFTs.    Patient is doing well.  She is completely asymptomatic at this time.  No further nausea or vomiting and no diarrhea.  She has tolerated her diet well.  LFTs are trending down but today's results are still pending.  She wishes to go home.  No prior GI issues.  No history of any Tylenol use.  Acute hepatitis panel is negative.    CT abdomen pelvis showed previous cholecystectomy without any biliary ductal dilation.    Review of Systems     All systems were reviewed and negative except for: Mild arthritis.  Had some dysuria which is improved and generalized weakness.      Objective    Objective     Current Medications  Scheduled Meds:aspirin, 325 mg, Oral, Daily  cefTRIAXone, 1 g, Intravenous, Q24H  levothyroxine, 125 mcg, Oral, Q AM  losartan, 25 mg, Oral, BID  multivitamin with minerals, 1 tablet, Oral, Daily  oxybutynin XL, 5 mg, Oral, Daily  PARoxetine, 20 mg, Oral, QAM  potassium chloride, 20 mEq, Oral, BID With Meals      Continuous Infusions:   PRN Meds:.•  acetaminophen  •  albuterol sulfate HFA  •  aluminum-magnesium hydroxide-simethicone  •  nitroglycerin  •  ondansetron  •  sodium chloride     Vitals:  Temp:  [98.1 °F (36.7 °C)-101 °F (38.3 °C)] 98.1 °F (36.7 °C)  Heart Rate:  [46-54] 50  Resp:  [14-18] 15  BP: (135-184)/(61-94) 135/75    Physical Exam:   Constitutional: Awake, alert   Eyes: PERRLA, sclerae anicteric, no conjunctival injection   HENT: NCAT, mucous membranes moist   Neck: Supple, no thyromegaly, no lymphadenopathy, trachea midline   Respiratory: Clear to auscultation bilaterally, nonlabored respirations    Cardiovascular: RRR, no murmurs, rubs, or gallops, palpable pedal pulses bilaterally   Gastrointestinal: Positive bowel sounds, soft,  nontender, nondistended   Musculoskeletal: No bilateral ankle edema, no clubbing or cyanosis to extremities   Psychiatric: Appropriate affect, cooperative   Neurologic: Oriented x 3, strength symmetric in all extremities, Cranial Nerves grossly intact to confrontation, speech clear   Skin: No rashes        Results Review:    I have reviewed all of the patients current test results    Laboratory:    Lab Results (last 24 hours)     Procedure Component Value Units Date/Time    CBC & Differential [[id number]]  (Abnormal) Collected: 08/13/22 1007    Specimen: Blood Updated: 08/13/22 1103    Narrative:      The following orders were created for panel order CBC & Differential.  Procedure                               Abnormality         Status                     ---------                               -----------         ------                     CBC Auto Differential[[id number]]        Abnormal            Final result                 Please view results for these tests on the individual orders.    CBC Auto Differential [[id number]]  (Abnormal) Collected: 08/13/22 1007    Specimen: Blood Updated: 08/13/22 1103     WBC 6.45 10*3/mm3      RBC 4.30 10*6/mm3      Hemoglobin 14.0 g/dL      Hematocrit 40.3 %      MCV 93.7 fL      MCH 32.6 pg      MCHC 34.7 g/dL      RDW 13.3 %      RDW-SD 45.2 fl      MPV 10.0 fL      Platelets 231 10*3/mm3      Neutrophil % 69.0 %      Lymphocyte % 20.5 %      Monocyte % 5.9 %      Eosinophil % 3.3 %      Basophil % 0.5 %      Immature Grans % 0.8 %      Neutrophils, Absolute 4.46 10*3/mm3      Lymphocytes, Absolute 1.32 10*3/mm3      Monocytes, Absolute 0.38 10*3/mm3      Eosinophils, Absolute 0.21 10*3/mm3      Basophils, Absolute 0.03 10*3/mm3      Immature Grans, Absolute 0.05 10*3/mm3      nRBC 0.0 /100 WBC     Comprehensive Metabolic Panel [[id number]] Collected: 08/13/22 1007    Specimen: Blood Updated: 08/13/22 1052    Magnesium [[id number]] Collected: 08/13/22 1007    Specimen: Blood  Updated: 08/13/22 1052    Urine Culture - Urine, Urine, Catheter In/Out [[id number]]  (Abnormal)  (Susceptibility) Collected: 08/11/22 1320    Specimen: Urine, Catheter In/Out Updated: 08/13/22 1003     Urine Culture >100,000 CFU/mL Escherichia coli    Narrative:      Colonization of the urinary tract without infection is common. Treatment is discouraged unless the patient is symptomatic, pregnant, or undergoing an invasive urologic procedure.    Susceptibility      Escherichia coli      VICK      Ampicillin Susceptible      Ampicillin + Sulbactam Susceptible      Cefazolin Susceptible      Cefepime Susceptible      Ceftazidime Susceptible      Ceftriaxone Susceptible      Gentamicin Susceptible      Levofloxacin Susceptible      Nitrofurantoin Susceptible      Piperacillin + Tazobactam Susceptible      Trimethoprim + Sulfamethoxazole Susceptible                    Linear View                   Blood Culture - Blood, Arm, Left [[id number]]  (Normal) Collected: 08/11/22 2028    Specimen: Blood from Arm, Left Updated: 08/12/22 2047     Blood Culture No growth at 24 hours    Blood Culture - Blood, Hand, Left [[id number]]  (Normal) Collected: 08/11/22 2028    Specimen: Blood from Hand, Left Updated: 08/12/22 2047     Blood Culture No growth at 24 hours    COVID PRE-OP / PRE-PROCEDURE SCREENING ORDER (NO ISOLATION) - Swab, Nasopharynx [[id number]]  (Normal) Collected: 08/12/22 1042    Specimen: Swab from Nasopharynx Updated: 08/12/22 1543    Narrative:      The following orders were created for panel order COVID PRE-OP / PRE-PROCEDURE SCREENING ORDER (NO ISOLATION) - Swab, Nasopharynx.  Procedure                               Abnormality         Status                     ---------                               -----------         ------                     COVID-19,APTIMA PANTHER(...[[id number]]  Normal              Final result                 Please view results for these tests on the individual orders.    COVID-19,APTIMA  WILLIAM(CON),[facility], NP/OP SWAB IN UTM/VTM/SALINE TRANSPORT MEDIA,24 HR TAT - Swab, Nasopharynx [[id number]]  (Normal) Collected: 08/12/22 1042    Specimen: Swab from Nasopharynx Updated: 08/12/22 1543     COVID19 Not Detected    Narrative:      Fact sheet for providers: https://www.fda.gov/media/574561/download     Fact sheet for patients: https://www.fda.gov/media/225169/download    Test performed by RT PCR.    Hepatitis Panel, Acute [[id number]]  (Normal) Collected: 08/11/22 2027    Specimen: Blood Updated: 08/12/22 1515     Hepatitis B Surface Ag Non-Reactive     Hep A IgM Non-Reactive     Hep B C IgM Non-Reactive     Hepatitis C Ab Non-Reactive    Narrative:      Results may be falsely decreased if patient taking Biotin.            Radiology:    Imaging Results (Last 24 Hours)     ** No results found for the last 24 hours. **            Assessment & Plan     Patient Active Problem List   Diagnosis Code   • Symptomatic bradycardia R00.1       #1 elevated liver test.    Most likely etiology is ischemic hepatopathy.  Patient is now asymptomatic.  LFTs improving.  If these are trending down then no further GI evaluation is needed.  She can follow-up with her primary care physician or outpatient gastroenterologist till the LFTs have normalized.     #2 hypokalemia    Replacement and evaluation/treatment as per primary team and cardiology.      Reyes De La Garza MD  08/13/22  11:10 EDT      Electronically signed by Reyes De La Garza MD, 08/13/22, 11:10 AM EDT.

## 2022-08-13 NOTE — PROGRESS NOTES
CARDIOLOGY  INPATIENT PROGRESS NOTE                [facility]    8/13/2022      PATIENT IDENTIFICATION:   Name:  Cristina Pina      MRN:  [id number]     86 y.o.  female                 SUBJECTIVE:   No further nausea vomiting and diarrhea  Mild bradycardia    OBJECTIVE:  Vitals:    08/13/22 0352 08/13/22 0740 08/13/22 1145 08/13/22 1220   BP: 179/72 135/75 (!) 181/62 145/70   BP Location: Right arm Right arm Right arm Right arm   Patient Position: Lying Lying  Lying   Pulse: (!) 46 50 (!) 45 50   Resp: 14 15 16    Temp: 98.9 °F (37.2 °C) 98.1 °F (36.7 °C) 97.7 °F (36.5 °C)    TempSrc: Oral Oral Oral    SpO2: 95% 96% 93%    Weight:       Height:               Body mass index is 34.7 kg/m².    Intake/Output Summary (Last 24 hours) at 8/13/2022 1425  Last data filed at 8/13/2022 1349  Gross per 24 hour   Intake 840 ml   Output 1300 ml   Net -460 ml       Telemetry:     Physical Exam  General Appearance:   · no acute distress  · Alert and oriented x3  HENT:   · lips not cyanotic  · Atraumatic  Neck:  · No jvd   · supple  Respiratory:  · no respiratory distress  · normal breath sounds  · no rales  Cardiovascular:    · regular rhythm  · no S3, no S4   · no murmur  · no rub  · lower extremity edema: none    Skin:   · warm, dry  · No rashes      Allergies   Allergen Reactions   • Ciprofibrate Hives   • Sulfa Antibiotics Hives       Scheduled meds:  aspirin, 325 mg, Oral, Daily  cefTRIAXone, 1 g, Intravenous, Q24H  levothyroxine, 125 mcg, Oral, Q AM  losartan, 25 mg, Oral, BID  multivitamin with minerals, 1 tablet, Oral, Daily  oxybutynin XL, 5 mg, Oral, Daily  PARoxetine, 20 mg, Oral, QAM  potassium chloride, 20 mEq, Oral, BID With Meals      IV meds:                           Data Review:  CBC    CBC 8/11/22 8/11/22 8/12/22 8/13/22 1117 2027     WBC 7.10 7.23 6.95 6.45   RBC 4.31 4.06 3.83 4.30   Hemoglobin 14.0 13.2 12.2 14.0   Hematocrit 40.1 37.3 35.4 40.3   MCV 93.0 91.9 92.4 93.7    MCH 32.5 32.5 31.9 32.6   MCHC 34.9 35.4 34.5 34.7   RDW 13.1 13.2 12.9 13.3   Platelets 234 222 219 231           CMP    CMP 8/11/22 8/11/22 8/12/22 8/13/22    1117 2027     Glucose 86 77 71 151 (A)   BUN 20 17 16 12   Creatinine 0.76 0.64 0.59 0.64   Sodium 143 144 144 143   Potassium 2.6 (A) 2.6 (A) 2.8 (A) 3.1 (A)   Chloride 105 103 104 102   Calcium 9.3 8.9 8.5 (A) 9.1   Albumin 4.40 4.20 3.80 3.90   Total Bilirubin 1.3 (A) 1.1 1.0 0.7   Alkaline Phosphatase 82 76 71 78   AST (SGOT) 2,081 (A) 1,347 (A) 879 (A) 331 (A)   ALT (SGPT) 2,951 (A) 2,362 (A) 1,917 (A) 1,247 (A)   (A) Abnormal value       Comments are available for some flowsheets but are not being displayed.            CARDIAC LABS:      Lab 08/13/22  1007 08/12/22  0455   PROBNP 183.8  --    TROPONIN T <0.010  --    PROTIME  --  15.4*   INR  --  1.20*        No results found for: DIGOXIN   Lab Results   Component Value Date    TSH 3.950 08/12/2022           Invalid input(s): LDLCALC  Lab Results   Component Value Date    POCTROP 0.06 08/11/2022     Lab Results   Component Value Date    TROPONINT <0.010 08/13/2022   (  Lab Results   Component Value Date    MG 1.7 08/13/2022              ASSESSMENT:  Mild sinus bradycardia chronic  Nausea vomiting and diarrhea of unknown etiology  Severe hypokalemia  Coronary artery disease with previous stent placement  Hypertension      PLAN:   Her blood pressure is well controlled now  Okay to discharge home from cardiac standpoint  Follow-up in office in 1 week            Lennox Lawson MD  8/13/2022    14:25 EDT

## 2022-08-14 VITALS
HEIGHT: 67 IN | HEART RATE: 57 BPM | WEIGHT: 221.56 LBS | TEMPERATURE: 97.7 F | SYSTOLIC BLOOD PRESSURE: 150 MMHG | OXYGEN SATURATION: 94 % | DIASTOLIC BLOOD PRESSURE: 80 MMHG | BODY MASS INDEX: 34.78 KG/M2 | RESPIRATION RATE: 15 BRPM

## 2022-08-14 LAB
ALBUMIN SERPL-MCNC: 3.9 G/DL (ref 3.5–5.2)
ALBUMIN/GLOB SERPL: 1.4 G/DL
ALP SERPL-CCNC: 76 U/L (ref 39–117)
ALT SERPL W P-5'-P-CCNC: 855 U/L (ref 1–33)
ANION GAP SERPL CALCULATED.3IONS-SCNC: 13.5 MMOL/L (ref 5–15)
AST SERPL-CCNC: 165 U/L (ref 1–32)
BASOPHILS # BLD AUTO: 0.04 10*3/MM3 (ref 0–0.2)
BASOPHILS NFR BLD AUTO: 0.6 % (ref 0–1.5)
BILIRUB SERPL-MCNC: 0.7 MG/DL (ref 0–1.2)
BUN SERPL-MCNC: 10 MG/DL (ref 8–23)
BUN/CREAT SERPL: 19.2 (ref 7–25)
CALCIUM SPEC-SCNC: 9.2 MG/DL (ref 8.6–10.5)
CHLORIDE SERPL-SCNC: 104 MMOL/L (ref 98–107)
CO2 SERPL-SCNC: 25.5 MMOL/L (ref 22–29)
CREAT SERPL-MCNC: 0.52 MG/DL (ref 0.57–1)
DEPRECATED RDW RBC AUTO: 45.3 FL (ref 37–54)
EGFRCR SERPLBLD CKD-EPI 2021: 91.7 ML/MIN/1.73
EOSINOPHIL # BLD AUTO: 0.3 10*3/MM3 (ref 0–0.4)
EOSINOPHIL NFR BLD AUTO: 4.3 % (ref 0.3–6.2)
ERYTHROCYTE [DISTWIDTH] IN BLOOD BY AUTOMATED COUNT: 13.5 % (ref 12.3–15.4)
GLOBULIN UR ELPH-MCNC: 2.7 GM/DL
GLUCOSE SERPL-MCNC: 108 MG/DL (ref 65–99)
HCT VFR BLD AUTO: 40 % (ref 34–46.6)
HGB BLD-MCNC: 14 G/DL (ref 12–15.9)
IMM GRANULOCYTES # BLD AUTO: 0.05 10*3/MM3 (ref 0–0.05)
IMM GRANULOCYTES NFR BLD AUTO: 0.7 % (ref 0–0.5)
LYMPHOCYTES # BLD AUTO: 1.87 10*3/MM3 (ref 0.7–3.1)
LYMPHOCYTES NFR BLD AUTO: 27 % (ref 19.6–45.3)
MAGNESIUM SERPL-MCNC: 1.7 MG/DL (ref 1.6–2.4)
MCH RBC QN AUTO: 32.9 PG (ref 26.6–33)
MCHC RBC AUTO-ENTMCNC: 35 G/DL (ref 31.5–35.7)
MCV RBC AUTO: 93.9 FL (ref 79–97)
MONOCYTES # BLD AUTO: 0.54 10*3/MM3 (ref 0.1–0.9)
MONOCYTES NFR BLD AUTO: 7.8 % (ref 5–12)
NEUTROPHILS NFR BLD AUTO: 4.13 10*3/MM3 (ref 1.7–7)
NEUTROPHILS NFR BLD AUTO: 59.6 % (ref 42.7–76)
NRBC BLD AUTO-RTO: 0 /100 WBC (ref 0–0.2)
PLATELET # BLD AUTO: 224 10*3/MM3 (ref 140–450)
PMV BLD AUTO: 9.6 FL (ref 6–12)
POTASSIUM SERPL-SCNC: 3.4 MMOL/L (ref 3.5–5.2)
PROT SERPL-MCNC: 6.6 G/DL (ref 6–8.5)
QT INTERVAL: 591 MS
RBC # BLD AUTO: 4.26 10*6/MM3 (ref 3.77–5.28)
SODIUM SERPL-SCNC: 143 MMOL/L (ref 136–145)
TROPONIN T SERPL-MCNC: <0.01 NG/ML (ref 0–0.03)
WBC NRBC COR # BLD: 6.93 10*3/MM3 (ref 3.4–10.8)

## 2022-08-14 PROCEDURE — 85025 COMPLETE CBC W/AUTO DIFF WBC: CPT | Performed by: INTERNAL MEDICINE

## 2022-08-14 PROCEDURE — 80053 COMPREHEN METABOLIC PANEL: CPT | Performed by: INTERNAL MEDICINE

## 2022-08-14 PROCEDURE — 99232 SBSQ HOSP IP/OBS MODERATE 35: CPT | Performed by: INTERNAL MEDICINE

## 2022-08-14 PROCEDURE — 94799 UNLISTED PULMONARY SVC/PX: CPT

## 2022-08-14 PROCEDURE — 83735 ASSAY OF MAGNESIUM: CPT | Performed by: INTERNAL MEDICINE

## 2022-08-14 RX ORDER — LEVOFLOXACIN 250 MG/1
250 TABLET ORAL EVERY 24 HOURS
Qty: 4 TABLET | Refills: 0 | Status: SHIPPED | OUTPATIENT
Start: 2022-08-14 | End: 2022-08-18

## 2022-08-14 RX ORDER — LOSARTAN POTASSIUM 50 MG/1
50 TABLET ORAL 2 TIMES DAILY
Status: DISCONTINUED | OUTPATIENT
Start: 2022-08-14 | End: 2022-08-14 | Stop reason: HOSPADM

## 2022-08-14 RX ORDER — POTASSIUM CHLORIDE 750 MG/1
20 CAPSULE, EXTENDED RELEASE ORAL 2 TIMES DAILY WITH MEALS
Qty: 30 CAPSULE | Refills: 1 | Status: SHIPPED | OUTPATIENT
Start: 2022-08-14

## 2022-08-14 RX ORDER — NITROGLYCERIN 0.4 MG/1
0.4 TABLET SUBLINGUAL
Qty: 30 TABLET | Refills: 12 | Status: SHIPPED | OUTPATIENT
Start: 2022-08-14

## 2022-08-14 RX ORDER — LOSARTAN POTASSIUM 50 MG/1
50 TABLET ORAL 2 TIMES DAILY
Qty: 60 TABLET | Refills: 0 | Status: SHIPPED | OUTPATIENT
Start: 2022-08-14 | End: 2022-09-13

## 2022-08-14 RX ORDER — POTASSIUM CHLORIDE 750 MG/1
40 CAPSULE, EXTENDED RELEASE ORAL ONCE
Status: COMPLETED | OUTPATIENT
Start: 2022-08-14 | End: 2022-08-14

## 2022-08-14 RX ADMIN — POTASSIUM CHLORIDE 40 MEQ: 750 CAPSULE, EXTENDED RELEASE ORAL at 10:06

## 2022-08-14 RX ADMIN — OXYBUTYNIN CHLORIDE 5 MG: 5 TABLET, EXTENDED RELEASE ORAL at 08:15

## 2022-08-14 RX ADMIN — POTASSIUM CHLORIDE 20 MEQ: 750 CAPSULE, EXTENDED RELEASE ORAL at 08:15

## 2022-08-14 RX ADMIN — LEVOTHYROXINE SODIUM 125 MCG: 125 TABLET ORAL at 06:17

## 2022-08-14 RX ADMIN — PAROXETINE HYDROCHLORIDE 20 MG: 20 TABLET, FILM COATED ORAL at 06:17

## 2022-08-14 RX ADMIN — LOSARTAN POTASSIUM 25 MG: 25 TABLET, FILM COATED ORAL at 08:15

## 2022-08-14 RX ADMIN — ASPIRIN 325 MG ORAL TABLET 325 MG: 325 PILL ORAL at 08:15

## 2022-08-14 RX ADMIN — MULTIPLE VITAMINS W/ MINERALS TAB 1 TABLET: TAB at 08:15

## 2022-08-14 NOTE — PROGRESS NOTES
Gastroenterology  Inpatient Progress Note    Cristina Pina  1935 8/14/2022    Subjective   Subjective     HPI:  Cristina Pina is a 86 y.o. female Admitted with nausea, vomiting and diarrhea.  GI consultation obtained because of elevated LFTs.    Patient is doing well.  She is completely asymptomatic at this time.  She complained of minimal nausea this morning but no vomiting and no diarrhea.  She has tolerated her diet well.  Eating breakfast this morning.  LFTs are trending down .  She wishes to go home.  No prior GI issues.  No history of any Tylenol use.  Acute hepatitis panel is negative.    CT abdomen pelvis showed previous cholecystectomy without any biliary ductal dilation.    Review of Systems     All systems were reviewed and negative except for: Mild arthritis.  Had some dysuria which is improved and generalized weakness.      Objective    Objective     Current Medications  Scheduled Meds:aspirin, 325 mg, Oral, Daily  levoFLOXacin, 250 mg, Oral, Q24H  levothyroxine, 125 mcg, Oral, Q AM  losartan, 25 mg, Oral, BID  multivitamin with minerals, 1 tablet, Oral, Daily  oxybutynin XL, 5 mg, Oral, Daily  PARoxetine, 20 mg, Oral, QAM  potassium chloride, 20 mEq, Oral, BID With Meals      Continuous Infusions:   PRN Meds:.•  acetaminophen  •  albuterol sulfate HFA  •  aluminum-magnesium hydroxide-simethicone  •  nitroglycerin  •  ondansetron  •  sodium chloride     Vitals:  Temp:  [97.7 °F (36.5 °C)-98.8 °F (37.1 °C)] 98.6 °F (37 °C)  Heart Rate:  [45-57] 52  Resp:  [15-18] 17  BP: (145-219)/(62-88) 185/80    Physical Exam:   Constitutional: Awake, alert   Eyes: PERRLA, sclerae anicteric, no conjunctival injection   HENT: NCAT, mucous membranes moist   Neck: Supple, no thyromegaly, no lymphadenopathy, trachea midline   Respiratory: Clear to auscultation bilaterally, nonlabored respirations    Cardiovascular: RRR, no murmurs, rubs, or gallops, palpable pedal pulses bilaterally   Gastrointestinal:  Positive bowel sounds, soft, nontender, nondistended   Musculoskeletal: No bilateral ankle edema, no clubbing or cyanosis to extremities   Psychiatric: Appropriate affect, cooperative   Neurologic: Oriented x 3, strength symmetric in all extremities, Cranial Nerves grossly intact to confrontation, speech clear   Skin: No rashes        Results Review:    I have reviewed all of the patients current test results    Laboratory:    Lab Results (last 24 hours)     Procedure Component Value Units Date/Time    Comprehensive Metabolic Panel [[id number]]  (Abnormal) Collected: 08/14/22 0756    Specimen: Blood Updated: 08/14/22 0903     Glucose 108 mg/dL      BUN 10 mg/dL      Creatinine 0.52 mg/dL      Sodium 143 mmol/L      Potassium 3.4 mmol/L      Chloride 104 mmol/L      CO2 25.5 mmol/L      Calcium 9.2 mg/dL      Total Protein 6.6 g/dL      Albumin 3.90 g/dL      ALT (SGPT) 855 U/L      AST (SGOT) 165 U/L      Alkaline Phosphatase 76 U/L      Total Bilirubin 0.7 mg/dL      Globulin 2.7 gm/dL      A/G Ratio 1.4 g/dL      BUN/Creatinine Ratio 19.2     Anion Gap 13.5 mmol/L      eGFR 91.7 mL/min/1.73      Comment: National Kidney Foundation and American Society of Nephrology (ASN) Task Force recommended calculation based on the Chronic Kidney Disease Epidemiology Collaboration (CKD-EPI) equation refit without adjustment for race.       Narrative:      GFR Normal >60  Chronic Kidney Disease <60  Kidney Failure <15      Magnesium [[id number]]  (Normal) Collected: 08/14/22 0756    Specimen: Blood Updated: 08/14/22 0851     Magnesium 1.7 mg/dL     CBC & Differential [[id number]]  (Abnormal) Collected: 08/14/22 0756    Specimen: Blood Updated: 08/14/22 0830    Narrative:      The following orders were created for panel order CBC & Differential.  Procedure                               Abnormality         Status                     ---------                               -----------         ------                     CBC Auto  Differential[[id number]]        Abnormal            Final result                 Please view results for these tests on the individual orders.    CBC Auto Differential [[id number]]  (Abnormal) Collected: 08/14/22 0756    Specimen: Blood Updated: 08/14/22 0830     WBC 6.93 10*3/mm3      RBC 4.26 10*6/mm3      Hemoglobin 14.0 g/dL      Hematocrit 40.0 %      MCV 93.9 fL      MCH 32.9 pg      MCHC 35.0 g/dL      RDW 13.5 %      RDW-SD 45.3 fl      MPV 9.6 fL      Platelets 224 10*3/mm3      Neutrophil % 59.6 %      Lymphocyte % 27.0 %      Monocyte % 7.8 %      Eosinophil % 4.3 %      Basophil % 0.6 %      Immature Grans % 0.7 %      Neutrophils, Absolute 4.13 10*3/mm3      Lymphocytes, Absolute 1.87 10*3/mm3      Monocytes, Absolute 0.54 10*3/mm3      Eosinophils, Absolute 0.30 10*3/mm3      Basophils, Absolute 0.04 10*3/mm3      Immature Grans, Absolute 0.05 10*3/mm3      nRBC 0.0 /100 WBC     Troponin [[id number]]  (Normal) Collected: 08/13/22 2350    Specimen: Blood Updated: 08/14/22 0013     Troponin T <0.010 ng/mL     Narrative:      Troponin T Reference Range:  <= 0.03 ng/mL-   Negative for AMI  >0.03 ng/mL-     Abnormal for myocardial necrosis.  Clinicians would have to utilize clinical acumen, EKG, Troponin and serial changes to determine if it is an Acute Myocardial Infarction or myocardial injury due to an underlying chronic condition.       Results may be falsely decreased if patient taking Biotin.      Blood Culture - Blood, Arm, Left [[id number]]  (Normal) Collected: 08/11/22 2028    Specimen: Blood from Arm, Left Updated: 08/13/22 2045     Blood Culture No growth at 2 days    Blood Culture - Blood, Hand, Left [[id number]]  (Normal) Collected: 08/11/22 2028    Specimen: Blood from Hand, Left Updated: 08/13/22 2045     Blood Culture No growth at 2 days    Troponin [[id number]]  (Normal) Collected: 08/13/22 1811    Specimen: Blood Updated: 08/13/22 1909     Troponin T <0.010 ng/mL     Narrative:      Troponin T  Reference Range:  <= 0.03 ng/mL-   Negative for AMI  >0.03 ng/mL-     Abnormal for myocardial necrosis.  Clinicians would have to utilize clinical acumen, EKG, Troponin and serial changes to determine if it is an Acute Myocardial Infarction or myocardial injury due to an underlying chronic condition.       Results may be falsely decreased if patient taking Biotin.      BNP [[id number]]  (Normal) Collected: 08/13/22 1007    Specimen: Blood Updated: 08/13/22 1224     proBNP 183.8 pg/mL     Narrative:      Among patients with dyspnea, NT-proBNP is highly sensitive for the detection of acute congestive heart failure. In addition NT-proBNP of <300 pg/ml effectively rules out acute congestive heart failure with 99% negative predictive value.    Results may be falsely decreased if patient taking Biotin.      Comprehensive Metabolic Panel [[id number]]  (Abnormal) Collected: 08/13/22 1007    Specimen: Blood Updated: 08/13/22 1130     Glucose 151 mg/dL      BUN 12 mg/dL      Creatinine 0.64 mg/dL      Sodium 143 mmol/L      Potassium 3.1 mmol/L      Comment: Slight hemolysis detected by analyzer. Results may be affected.        Chloride 102 mmol/L      CO2 26.5 mmol/L      Calcium 9.1 mg/dL      Total Protein 6.7 g/dL      Albumin 3.90 g/dL      ALT (SGPT) 1,247 U/L      AST (SGOT) 331 U/L      Alkaline Phosphatase 78 U/L      Total Bilirubin 0.7 mg/dL      Globulin 2.8 gm/dL      A/G Ratio 1.4 g/dL      BUN/Creatinine Ratio 18.8     Anion Gap 14.5 mmol/L      eGFR 87.3 mL/min/1.73      Comment: National Kidney Foundation and American Society of Nephrology (ASN) Task Force recommended calculation based on the Chronic Kidney Disease Epidemiology Collaboration (CKD-EPI) equation refit without adjustment for race.       Narrative:      GFR Normal >60  Chronic Kidney Disease <60  Kidney Failure <15      Troponin [[id number]]  (Normal) Collected: 08/13/22 1007    Specimen: Blood Updated: 08/13/22 1128     Troponin T <0.010 ng/mL      Narrative:      Troponin T Reference Range:  <= 0.03 ng/mL-   Negative for AMI  >0.03 ng/mL-     Abnormal for myocardial necrosis.  Clinicians would have to utilize clinical acumen, EKG, Troponin and serial changes to determine if it is an Acute Myocardial Infarction or myocardial injury due to an underlying chronic condition.       Results may be falsely decreased if patient taking Biotin.      Magnesium [[id number]]  (Normal) Collected: 08/13/22 1007    Specimen: Blood Updated: 08/13/22 1115     Magnesium 1.7 mg/dL     CBC & Differential [[id number]]  (Abnormal) Collected: 08/13/22 1007    Specimen: Blood Updated: 08/13/22 1103    Narrative:      The following orders were created for panel order CBC & Differential.  Procedure                               Abnormality         Status                     ---------                               -----------         ------                     CBC Auto Differential[[id number]]        Abnormal            Final result                 Please view results for these tests on the individual orders.    CBC Auto Differential [[id number]]  (Abnormal) Collected: 08/13/22 1007    Specimen: Blood Updated: 08/13/22 1103     WBC 6.45 10*3/mm3      RBC 4.30 10*6/mm3      Hemoglobin 14.0 g/dL      Hematocrit 40.3 %      MCV 93.7 fL      MCH 32.6 pg      MCHC 34.7 g/dL      RDW 13.3 %      RDW-SD 45.2 fl      MPV 10.0 fL      Platelets 231 10*3/mm3      Neutrophil % 69.0 %      Lymphocyte % 20.5 %      Monocyte % 5.9 %      Eosinophil % 3.3 %      Basophil % 0.5 %      Immature Grans % 0.8 %      Neutrophils, Absolute 4.46 10*3/mm3      Lymphocytes, Absolute 1.32 10*3/mm3      Monocytes, Absolute 0.38 10*3/mm3      Eosinophils, Absolute 0.21 10*3/mm3      Basophils, Absolute 0.03 10*3/mm3      Immature Grans, Absolute 0.05 10*3/mm3      nRBC 0.0 /100 WBC            Radiology:    Imaging Results (Last 24 Hours)     ** No results found for the last 24 hours. **            Assessment & Plan      Patient Active Problem List   Diagnosis Code   • Symptomatic bradycardia R00.1       #1 elevated liver test.    Most likely etiology is ischemic hepatopathy.  Patient is now asymptomatic.  LFTs improving.  If these are trending down then no further GI evaluation is needed.  She can follow-up with her primary care physician or outpatient gastroenterologist till the LFTs have normalized.  No further GI recommendations for today.  We will see again if requested.    #2 hypokalemia    Replacement and evaluation/treatment as per primary team and cardiology.      Reyes De La Garza MD  08/14/22  10:09 EDT      Electronically signed by Reyes De La Garza MD, 08/13/22, 11:10 AM EDT.

## 2022-08-14 NOTE — PROGRESS NOTES
CARDIOLOGY  INPATIENT PROGRESS NOTE                [facility]    8/14/2022      PATIENT IDENTIFICATION:   Name:  Cristina Pina      MRN:  [id number]     86 y.o.  female                 SUBJECTIVE:   No further nausea vomiting or diarrhea  Sinus bradycardia    OBJECTIVE:  Vitals:    08/14/22 0436 08/14/22 0828 08/14/22 0845 08/14/22 1050   BP: 180/79  (!) 185/80 150/80   BP Location: Right arm  Right arm Right arm   Patient Position: Lying  Lying Sitting   Pulse: 51  52 57   Resp: 18  17 15   Temp: 97.9 °F (36.6 °C)  98.6 °F (37 °C) 97.7 °F (36.5 °C)   TempSrc:   Oral Oral   SpO2: 94% 93% 94% 94%   Weight:       Height:               Body mass index is 34.7 kg/m².    Intake/Output Summary (Last 24 hours) at 8/14/2022 1137  Last data filed at 8/14/2022 1006  Gross per 24 hour   Intake 660 ml   Output 2100 ml   Net -1440 ml       Telemetry:     Physical Exam  General Appearance:   · no acute distress  · Alert and oriented x3  HENT:   · lips not cyanotic  · Atraumatic  Neck:  · No jvd   · supple  Respiratory:  · no respiratory distress  · normal breath sounds  · no rales  Cardiovascular:    · regular rhythm  · no S3, no S4   · no murmur  · no rub  · lower extremity edema: none    Skin:   · warm, dry  · No rashes      Allergies   Allergen Reactions   • Ciprofibrate Hives   • Sulfa Antibiotics Hives       Scheduled meds:  aspirin, 325 mg, Oral, Daily  levoFLOXacin, 250 mg, Oral, Q24H  levothyroxine, 125 mcg, Oral, Q AM  losartan, 50 mg, Oral, BID  multivitamin with minerals, 1 tablet, Oral, Daily  oxybutynin XL, 5 mg, Oral, Daily  PARoxetine, 20 mg, Oral, QAM  potassium chloride, 20 mEq, Oral, BID With Meals      IV meds:                           Data Review:  CBC    CBC 8/12/22 8/13/22 8/14/22   WBC 6.95 6.45 6.93   RBC 3.83 4.30 4.26   Hemoglobin 12.2 14.0 14.0   Hematocrit 35.4 40.3 40.0   MCV 92.4 93.7 93.9   MCH 31.9 32.6 32.9   MCHC 34.5 34.7 35.0   RDW 12.9 13.3 13.5   Platelets  219 231 224           CMP    CMP 8/12/22 8/13/22 8/14/22   Glucose 71 151 (A) 108 (A)   BUN 16 12 10   Creatinine 0.59 0.64 0.52 (A)   Sodium 144 143 143   Potassium 2.8 (A) 3.1 (A) 3.4 (A)   Chloride 104 102 104   Calcium 8.5 (A) 9.1 9.2   Albumin 3.80 3.90 3.90   Total Bilirubin 1.0 0.7 0.7   Alkaline Phosphatase 71 78 76   AST (SGOT) 879 (A) 331 (A) 165 (A)   ALT (SGPT) 1,917 (A) 1,247 (A) 855 (A)   (A) Abnormal value       Comments are available for some flowsheets but are not being displayed.            CARDIAC LABS:      Lab 08/13/22  2350 08/13/22  1811 08/13/22  1007 08/12/22  0455   PROBNP  --   --  183.8  --    TROPONIN T <0.010 <0.010 <0.010  --    PROTIME  --   --   --  15.4*   INR  --   --   --  1.20*        No results found for: DIGOXIN   Lab Results   Component Value Date    TSH 3.950 08/12/2022           Invalid input(s): LDLCALC  Lab Results   Component Value Date    POCTROP 0.06 08/11/2022     Lab Results   Component Value Date    TROPONINT <0.010 08/13/2022   (  Lab Results   Component Value Date    MG 1.7 08/14/2022              ASSESSMENT:  Mild sinus bradycardia chronic  Nausea vomiting and diarrhea of unknown etiology  Severe hypokalemia  Coronary artery disease with previous stent placement  Hypertension      PLAN:   Stable from cardiac standpoint  Follow-up in 1 week            Lennox Lawson MD  8/14/2022    11:37 EDT

## 2022-08-14 NOTE — DISCHARGE SUMMARY
[facility]         DISCHARGE SUMMARY    Patient Name: Cristina Pina  : 1935  MRN: [id number]    Date of Admission: 2022  Date of Discharge: 2022  Primary Care Physician: Michael Serrano MD    Consults     Date and Time Order Name Status Description    2022 11:34 AM Inpatient Gastroenterology Consult      2022  5:54 PM Inpatient Cardiology Consult      2022  4:21 PM IP General Consult (Use specialty-specific consult if known)            Presenting Problem:   Hiatal hernia [K44.9]  Hypokalemia [E87.6]  Elevated LFTs [R79.89]  Symptomatic bradycardia [R00.1]  Nausea vomiting and diarrhea [R11.2, R19.7]  Abdominal pain, unspecified abdominal location [R10.9]    Active and Resolved Hospital Problems:  Active Hospital Problems    Diagnosis POA   • Symptomatic bradycardia [R00.1] Yes      Resolved Hospital Problems   No resolved problems to display.         Hospital Course     Hospital Course:  Cristina Pina is a 86 y.o. female patient was admitted with bradycardia history of coronary artery disease who had very high elevated LFTs and has had diarrhea nausea vomiting at home but not since she came to the hospital she had a CT scan which was unremarkable was evaluated by cardiology who will be seeing her as an outpatient for further evaluation, because of highly elevated LFTs a consultation was made with gastroenterology this appeared to be transient and her and her liver enzymes are getting better it was thought that it may be because of ischemic hepatic injury possible viral syndrome because patient did have fever diarrhea nausea vomiting at home but not since she came to the hospital he did have UTI which has improved IV antibiotics have been given she wants to go home in stable doing fine her electrolytes have been corrected potassium is still low I have advised her to make sure to see next week her primary care physician to get checked      DISCHARGE Follow  Up Recommendations for labs and diagnostics: Sent with elevated liver enzymes with history of bradycardia which according to patient has been chronic has been long time she has history of coronary artery disease will be seen in a follow-up visit by cardiology as well as her primary care physician her liver enzymes are already reverting back to normal      Pertinent  and/or Most Recent Results     LAB RESULTS:      Lab 08/14/22 0756 08/13/22 1007 08/12/22 0455 08/11/22 2027 08/11/22  1117   WBC 6.93 6.45 6.95 7.23 7.10   HEMOGLOBIN 14.0 14.0 12.2 13.2 14.0   HEMATOCRIT 40.0 40.3 35.4 37.3 40.1   PLATELETS 224 231 219 222 234   NEUTROS ABS 4.13 4.46 4.68  --  5.38   IMMATURE GRANS (ABS) 0.05 0.05 0.04  --  0.04   LYMPHS ABS 1.87 1.32 1.47  --  1.15   MONOS ABS 0.54 0.38 0.45  --  0.32   EOS ABS 0.30 0.21 0.27  --  0.17   MCV 93.9 93.7 92.4 91.9 93.0   LACTATE  --   --   --   --  1.5   PROTIME  --   --  15.4*  --   --          Lab 08/14/22 0756 08/13/22 1007 08/12/22 0455 08/11/22 2027 08/11/22  1117   SODIUM 143 143 144 144 143   POTASSIUM 3.4* 3.1* 2.8* 2.6* 2.6*   CHLORIDE 104 102 104 103 105   CO2 25.5 26.5 23.5 24.7 22.8   ANION GAP 13.5 14.5 16.5* 16.3* 15.2*   BUN 10 12 16 17 20   CREATININE 0.52* 0.64 0.59 0.64 0.76   EGFR 91.7 87.3 89.0 87.3 77.4   GLUCOSE 108* 151* 71 77 86   CALCIUM 9.2 9.1 8.5* 8.9 9.3   MAGNESIUM 1.7 1.7  --   --  2.0   TSH  --   --  3.950 3.380  --          Lab 08/14/22 0756 08/13/22 1007 08/12/22 0455 08/11/22  2027 08/11/22  1117   TOTAL PROTEIN 6.6 6.7 6.2 6.5 7.0   ALBUMIN 3.90 3.90 3.80 4.20 4.40   GLOBULIN 2.7 2.8 2.4 2.3 2.6   ALT (SGPT) 855* 1,247* 1,917* 2,362* 2,951*   AST (SGOT) 165* 331* 879* 1,347* 2,081*   BILIRUBIN 0.7 0.7 1.0 1.1 1.3*   ALK PHOS 76 78 71 76 82   AMYLASE  --   --  42  --   --    LIPASE  --   --   --   --  15         Lab 08/13/22  2350 08/13/22  1811 08/13/22  1007 08/12/22  0455   PROBNP  --   --  183.8  --    TROPONIN T <0.010 <0.010 <0.010  --     PROTIME  --   --   --  15.4*   INR  --   --   --  1.20*             Lab 08/12/22  0455   IRON 71   IRON SATURATION 23   TIBC 314   TRANSFERRIN 211   VITAMIN B 12 >2,000*         Brief Urine Lab Results  (Last result in the past 365 days)      Color   Clarity   Blood   Leuk Est   Nitrite   Protein   CREAT   Urine HCG        08/11/22 1320 Yellow   Turbid   Moderate (2+)   Large (3+)   Negative   30 mg/dL (1+)               Microbiology Results (last 10 days)     Procedure Component Value - Date/Time    COVID PRE-OP / PRE-PROCEDURE SCREENING ORDER (NO ISOLATION) - Swab, Nasopharynx [[id number]]  (Normal) Collected: 08/12/22 1042    Lab Status: Final result Specimen: Swab from Nasopharynx Updated: 08/12/22 1543    Narrative:      The following orders were created for panel order COVID PRE-OP / PRE-PROCEDURE SCREENING ORDER (NO ISOLATION) - Swab, Nasopharynx.  Procedure                               Abnormality         Status                     ---------                               -----------         ------                     COVID-19,APTIMA PANTHER(...[[id number]]  Normal              Final result                 Please view results for these tests on the individual orders.    COVID-19,APTIMA PANTHER(CON),[facility], NP/OP SWAB IN UTM/VTM/SALINE TRANSPORT MEDIA,24 HR TAT - Swab, Nasopharynx [[id number]]  (Normal) Collected: 08/12/22 1042    Lab Status: Final result Specimen: Swab from Nasopharynx Updated: 08/12/22 1543     COVID19 Not Detected    Narrative:      Fact sheet for providers: https://www.fda.gov/media/691595/download     Fact sheet for patients: https://www.fda.gov/media/885554/download    Test performed by RT PCR.    Blood Culture - Blood, Arm, Left [[id number]]  (Normal) Collected: 08/11/22 2028    Lab Status: Preliminary result Specimen: Blood from Arm, Left Updated: 08/13/22 2045     Blood Culture No growth at 2 days    Blood Culture - Blood, Hand, Left [[id number]]  (Normal) Collected: 08/11/22  2028    Lab Status: Preliminary result Specimen: Blood from Hand, Left Updated: 08/13/22 2045     Blood Culture No growth at 2 days    Urine Culture - Urine, Urine, Catheter In/Out [[id number]]  (Abnormal)  (Susceptibility) Collected: 08/11/22 1320    Lab Status: Final result Specimen: Urine, Catheter In/Out Updated: 08/13/22 1003     Urine Culture >100,000 CFU/mL Escherichia coli    Narrative:      Colonization of the urinary tract without infection is common. Treatment is discouraged unless the patient is symptomatic, pregnant, or undergoing an invasive urologic procedure.    Susceptibility      Escherichia coli      VICK      Ampicillin Susceptible      Ampicillin + Sulbactam Susceptible      Cefazolin Susceptible      Cefepime Susceptible      Ceftazidime Susceptible      Ceftriaxone Susceptible      Gentamicin Susceptible      Levofloxacin Susceptible      Nitrofurantoin Susceptible      Piperacillin + Tazobactam Susceptible      Trimethoprim + Sulfamethoxazole Susceptible                                 CT Abdomen Pelvis With Contrast    Result Date: 8/11/2022  Impression:   1. No acute findings in the abdomen or pelvis. 2. Cholecystectomy and hysterectomy 3. Sigmoid diverticulosis without evidence of diverticulitis 4. Decompressed urinary bladder with mild bladder wall thickening.  There is a small amount of air within the urinary bladder. 5. Incidental hepatic and renal cysts. 6. Large hiatal hernia.  7. Coronary artery calcifications 8. Diffuse hepatic steatosis     Harrison Montano MD       Electronically Signed and Approved By: Harrison Montano MD on 8/11/2022 at 13:56             XR Chest 1 View    Result Date: 8/11/2022  Impression:   1. Probable large hiatal hernia. 2. Mild cardiomegaly.       YI CHRISTENSEN MD       Electronically Signed and Approved By: YI CHRISTENSEN MD on 8/11/2022 at 12:48                           Labs Pending at Discharge:  Pending Labs     Order Current Status    Mycoplasma Pneu. IgG /  IgM Antibodies In process    Systemic Lupus Profile A In process    Blood Culture - Blood, Arm, Left Preliminary result    Blood Culture - Blood, Hand, Left Preliminary result            Discharge Details        Discharge Medications      ASK your doctor about these medications      Instructions Start Date   albuterol sulfate  (90 Base) MCG/ACT inhaler  Commonly known as: PROVENTIL HFA;VENTOLIN HFA;PROAIR HFA   2 puffs, Inhalation, Every 4 Hours PRN      aspirin 325 MG tablet   325 mg, Oral, Daily      levothyroxine 125 MCG tablet  Commonly known as: SYNTHROID, LEVOTHROID   125 mcg, Oral, Daily      multivitamin with minerals tablet tablet   1 tablet, Oral, Daily      Omega-3 1000 MG capsule   1 capsule, Oral, Daily      oxybutynin XL 5 MG 24 hr tablet  Commonly known as: DITROPAN-XL   5 mg, Oral, Daily      PARoxetine 20 MG tablet  Commonly known as: PAXIL   20 mg, Oral, Every Morning      simvastatin 20 MG tablet  Commonly known as: ZOCOR   20 mg, Oral, Daily             Allergies   Allergen Reactions   • Ciprofibrate Hives   • Sulfa Antibiotics Hives         Discharge Disposition: As tolerated      Diet:  Hospital:  Diet Order   Procedures   • Diet Regular         Discharge Activity:         CODE STATUS:  Code Status and Medical Interventions:   Ordered at: 08/11/22 1943     Level Of Support Discussed With:    Patient     Code Status (Patient has no pulse and is not breathing):    CPR (Attempt to Resuscitate)     Medical Interventions (Patient has pulse or is breathing):    Full Support         No future appointments.    Additional Instructions for the Follow-ups that You Need to Schedule     Discharge Follow-up with Specified Provider: Dr. Lawson; 1 Week   As directed      To: Dr. Lawson    Follow Up: 1 Week               Time spent on Discharge including face to face service: 45 minutes    Electronically signed by Felice Guillermo MD, 08/14/22, 11:05 AM EDT.

## 2022-08-14 NOTE — DISCHARGE INSTR - APPOINTMENTS
Please call tomorrow to schedule follow-up appointment with  (cardiologist) for one week from now. Please let them know when calling, that this is a hospital follow-up appointment so that they can see you in the requested timeframe. [phone]

## 2022-08-15 ENCOUNTER — READMISSION MANAGEMENT (OUTPATIENT)
Dept: CALL CENTER | Facility: HOSPITAL | Age: 87
End: 2022-08-15

## 2022-08-15 LAB
CHROMATIN AB SERPL-ACNC: <0.2 AI (ref 0–0.9)
DSDNA AB SER-ACNC: <1 IU/ML (ref 0–9)
ENA RNP AB SER-ACNC: >8 AI (ref 0–0.9)
ENA SM AB SER-ACNC: <0.2 AI (ref 0–0.9)
ENA SS-A AB SER-ACNC: <0.2 AI (ref 0–0.9)
ENA SS-B AB SER-ACNC: <0.2 AI (ref 0–0.9)
M PNEUMO IGG SER IA-ACNC: 101 U/ML (ref 0–99)
M PNEUMO IGM SER IA-ACNC: <770 U/ML (ref 0–769)
RHEUMATOID FACT SERPL-ACNC: <10 IU/ML

## 2022-08-15 NOTE — OUTREACH NOTE
Prep Survey    Flowsheet Row Responses   [facility] patient discharged from? [facility]   Is LACE score < 7 ? Yes   Emergency Room discharge w/ pulse ox? No   Eligibility Not Eligible  [low risk for readmit]   What are the reasons patient is not eligible? Other   Does the patient have one of the following disease processes/diagnoses(primary or secondary)? Other   Prep survey completed? Yes          JEANNIE RICK - Registered Nurse

## 2022-08-16 LAB
BACTERIA SPEC AEROBE CULT: NORMAL
BACTERIA SPEC AEROBE CULT: NORMAL

## 2023-07-24 ENCOUNTER — OFFICE VISIT (OUTPATIENT)
Dept: SLEEP MEDICINE | Facility: HOSPITAL | Age: 88
End: 2023-07-24
Payer: MEDICARE

## 2023-07-24 VITALS
OXYGEN SATURATION: 95 % | DIASTOLIC BLOOD PRESSURE: 89 MMHG | WEIGHT: 221 LBS | BODY MASS INDEX: 34.69 KG/M2 | HEART RATE: 56 BPM | SYSTOLIC BLOOD PRESSURE: 138 MMHG | HEIGHT: 67 IN

## 2023-07-24 DIAGNOSIS — R41.3 MEMORY LOSS: ICD-10-CM

## 2023-07-24 DIAGNOSIS — R06.83 SNORING: ICD-10-CM

## 2023-07-24 DIAGNOSIS — G47.8 NON-RESTORATIVE SLEEP: ICD-10-CM

## 2023-07-24 DIAGNOSIS — G47.30 OBSERVED SLEEP APNEA: Primary | ICD-10-CM

## 2023-07-24 DIAGNOSIS — I10 ESSENTIAL HYPERTENSION, BENIGN: ICD-10-CM

## 2023-07-24 PROCEDURE — G0463 HOSPITAL OUTPT CLINIC VISIT: HCPCS

## 2023-07-24 PROCEDURE — 99204 OFFICE O/P NEW MOD 45 MIN: CPT | Performed by: INTERNAL MEDICINE

## 2023-07-24 NOTE — PROGRESS NOTES
[address] [address]  Phone: [phone]  Fax: [phone]      Cristina Pina  [id number]   1935  87 y.o.  female      PCP:Michael Serrano MD    Type of service: Initial New Patient Office Visit  Date of service: 7/24/2023    Chief Complaint   Patient presents with    Witnessed Apnea    Snoring    Non-restorative Sleep    Fatigue    Dry Mouth    Obesity       History of present illness;  Cristina Pina 87 y.o.  is a new patient for me and was seen today for sleep related problems of snoring, non-restorative sleep and witnessed apneas. The symptoms are present for many years and they are persistent in nature.  The snoring is present in all positions and it is loud.  Patient has no prior surgery namely tonsillectomy, nasal surgery and UPPP.     She moved to [place] in 2018 and she has been living with her son.  She also has some memory problems she states that she cannot remember.  Also has a history of hypertension.    Patient gives the following sleep history.  Sleep schedule:  Bedtime: 8 PM   Wake time: 6:30 AM  Normally takes about 30-60 minutes to fall asleep  Average hours of sleep 7  Number of naps per day 1  Symptoms  In addition to snoring, nonrestorative sleep and witnessed apneas patient gives the following associated symptoms.  Have you ever awakened gasping for breath, coughing, choking: Yes   Change in weight,  No   Morning headaches  No   Awaken with a sore throat or dry mouth  Yes   Leg jerking at night:  No   Crawly feeling/urge sensation to move in the legs: No   Teeth grinding:No   Have you ever awakened at night with a sour taste or burning sensation in your chest:  No   Do you have muscle weakness with laughing or anger or sleep paralysis:  No   Have you ever felt paralyzed while going to sleep or waking up:  No   Sleepwalking, nightmares, No   Nocturia (urination at night): 2 times per night  Memory Problem:Yes     Past  "medical history: (Relevant to sleep medicine)  Hypertension  Hypothyroidism  Urinary incontinence  Hyperlipidemia    Medications are reviewed by me and documented in the encounter  Allergies reviewed and documented in encounter    Social history:  Do you drive a commercial vehicle:  No   Shift work:  No   Tobacco use:  No   Alcohol use:  0 per week  Caffeinated drinks: 1    FAMILY HISTORY (Your mother, father, brothers and sisters) (relevant to sleep medicine)  No history of sleep apnea    REVIEW OF SYSTEMS.  Full review of systems available on the intake form which is scanned in the media tab.  The relevant positive are noted below  Daytime excessive sleepiness with Hart[place] Sleepiness Scale :Total score: 0   Snoring  Frequency of urination  Memory problems      Physical exam:  Vitals:    07/24/23 1300   BP: 138/89   Pulse: 56   SpO2: 95%   Weight: 100 kg (221 lb)   Height: 170.2 cm (67.01\")    Body mass index is 34.61 kg/m². Neck Circumference: 14 inches  Nose: no nasal septal defects or deviation and the nasal passages are clear, no nasal polyps,  Throat: tonsils are not enlarged, tongue normal, oral airway Mallampati class 3  NECK:Neck Circumference: 14 inches, trachea is in the midline, thyroid not enlarged  RESPIRATORY SYSTEM: Breath sounds are equal on both sides, there are no wheezes   CARDIOVASULAR SYSTEM: Heart sounds are regular rhythm and tj rate, no edema  EXTREMITES: No cyanosis, clubbing  NEUROLOGICAL SYSTEM: Oriented x 3, no gross motor defects, gait normal    Labs reviewed.  TSH Results:  TSH          8/11/2022    20:27 8/12/2022    04:55   TSH   TSH 3.380  3.950            Assessment and plan:  Witnessed apnea (R06.81) patient's symptoms and examination is consistent with sleep apnea (G47.30)  I have talked to the patient about the signs and symptoms of sleep apnea. In addition, I have also discussed pathophysiology of sleep apnea.  I also discussed the complications of untreated sleep apnea " including effects on hypertension, diabetes mellitus and nonrestorative sleep with hypersomnia which can increase risk for motor vehicle accidents.  Untreated sleep apnea is also a risk factor for development of atrial fibrillation, pulmonary hypertension, insulin resistance and stroke.  Discussed in detail of various testing methods including home-based and lab based sleep studies.  Based on history and physical examination the most appropriate study is full night polysomnography.  The order for the sleep study is placed in [facility].  The test will be scheduled after approval from insurance. Treatment and management will be discussed after the test is completed.  Patient was given opportunity to ask questions and all the questions were answered.   Snoring (R06.83) snoring is the sound created by turbulent airflow vibrating upper airway soft tissue.  I have also discussed factors affecting snoring including sleep deprivation, sleeping on the back and alcohol ingestion. To minimize snoring, patient is advised to have adequate sleep, sleep on the side and avoid alcohol and sedative medications before bedtime  Obesity 1, with BMI Body mass index is 34.61 kg/m².. I have discussed the relationship between weight and sleep apnea.There is direct correlation between weight and severity of sleep apnea.  Weight reduction is encouraged, as it is going to reduce the severity of sleep apnea. I have also discussed with the patient diet and exercise to achieve ideal body weight  Hypertension  Memory loss,     I have also discussed with the patient the following  Sleep hygiene: Maintaining a regular bedtime and wake time, not to watch television or work in bed, limit caffeine-containing beverages before bed time and avoid naps during the day  Adequate amount of sleep.  Generally most people needs about 7 to 8 hours of sleep.  Return for 31 to 90 days after PAP setup with down load..  Patient's questions were  answered.      7/24/2023  Tl Portillo MD  Sleep Medicine  Medical Director  [facility]: [facility] sleep [facility]

## 2024-12-18 ENCOUNTER — HOSPITAL ENCOUNTER (INPATIENT)
Facility: HOSPITAL | Age: 89
LOS: 3 days | Discharge: HOME | End: 2024-12-21
Attending: EMERGENCY MEDICINE | Admitting: STUDENT IN AN ORGANIZED HEALTH CARE EDUCATION/TRAINING PROGRAM
Payer: MEDICARE

## 2024-12-18 ENCOUNTER — APPOINTMENT (OUTPATIENT)
Dept: CT IMAGING | Facility: HOSPITAL | Age: 89
End: 2024-12-18
Payer: MEDICARE

## 2024-12-18 DIAGNOSIS — E87.6 HYPOKALEMIA: ICD-10-CM

## 2024-12-18 DIAGNOSIS — N39.0 ACUTE UTI (URINARY TRACT INFECTION): ICD-10-CM
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
## 2024-12-18 DIAGNOSIS — M54.50 ACUTE RIGHT-SIDED LOW BACK PAIN WITHOUT SCIATICA: ICD-10-CM

## 2024-12-18 DIAGNOSIS — R53.1 GENERALIZED WEAKNESS: Primary | ICD-10-CM

## 2024-12-18 DIAGNOSIS — R26.2 DIFFICULTY IN WALKING: ICD-10-CM

## 2024-12-18 DIAGNOSIS — I48.0 PAROXYSMAL ATRIAL FIBRILLATION: ICD-10-CM

## 2024-12-18 DIAGNOSIS — S32.030A COMPRESSION FRACTURE OF L3 VERTEBRA, INITIAL ENCOUNTER: ICD-10-CM

## 2024-12-18 LAB
ALBUMIN SERPL-MCNC: 3.6 G/DL (ref 3.5–5.2)
ALBUMIN/GLOB SERPL: 1.2 G/DL
ALP SERPL-CCNC: 83 U/L (ref 39–117)
ALT SERPL W P-5'-P-CCNC: 29 U/L (ref 1–33)
ANION GAP SERPL CALCULATED.3IONS-SCNC: 15.1 MMOL/L (ref 5–15)
AST SERPL-CCNC: 37 U/L (ref 1–32)
BACTERIA UR QL AUTO: ABNORMAL /HPF
BASOPHILS # BLD AUTO: 0.07 10*3/MM3 (ref 0–0.2)
BASOPHILS NFR BLD AUTO: 0.8 % (ref 0–1.5)
BILIRUB SERPL-MCNC: 0.7 MG/DL (ref 0–1.2)
BILIRUB UR QL STRIP: NEGATIVE
BUN SERPL-MCNC: 16 MG/DL (ref 8–23)
BUN/CREAT SERPL: 19.5 (ref 7–25)
CALCIUM SPEC-SCNC: 9 MG/DL (ref 8.6–10.5)
CHLORIDE SERPL-SCNC: 98 MMOL/L (ref 98–107)
CLARITY UR: ABNORMAL
CO2 SERPL-SCNC: 26.9 MMOL/L (ref 22–29)
COLOR UR: YELLOW
CREAT SERPL-MCNC: 0.82 MG/DL (ref 0.57–1)
D-LACTATE SERPL-SCNC: 1.9 MMOL/L (ref 0.5–2)
D-LACTATE SERPL-SCNC: 2.3 MMOL/L (ref 0.5–2)
DEPRECATED RDW RBC AUTO: 47 FL (ref 37–54)
EGFRCR SERPLBLD CKD-EPI 2021: 69.8 ML/MIN/1.73
EOSINOPHIL # BLD AUTO: 0.38 10*3/MM3 (ref 0–0.4)
EOSINOPHIL NFR BLD AUTO: 4.2 % (ref 0.3–6.2)
ERYTHROCYTE [DISTWIDTH] IN BLOOD BY AUTOMATED COUNT: 13.2 % (ref 12.3–15.4)
FLUAV SUBTYP SPEC NAA+PROBE: NOT DETECTED
FLUBV RNA ISLT QL NAA+PROBE: NOT DETECTED
GLOBULIN UR ELPH-MCNC: 2.9 GM/DL
GLUCOSE SERPL-MCNC: 138 MG/DL (ref 65–99)
GLUCOSE UR STRIP-MCNC: NEGATIVE MG/DL
HCT VFR BLD AUTO: 46.2 % (ref 34–46.6)
HGB BLD-MCNC: 15.4 G/DL (ref 12–15.9)
HGB UR QL STRIP.AUTO: NEGATIVE
HOLD SPECIMEN: NORMAL
HOLD SPECIMEN: NORMAL
HYALINE CASTS UR QL AUTO: ABNORMAL /LPF
IMM GRANULOCYTES # BLD AUTO: 0.1 10*3/MM3 (ref 0–0.05)
IMM GRANULOCYTES NFR BLD AUTO: 1.1 % (ref 0–0.5)
KETONES UR QL STRIP: ABNORMAL
LEUKOCYTE ESTERASE UR QL STRIP.AUTO: ABNORMAL
LIPASE SERPL-CCNC: 25 U/L (ref 13–60)
LYMPHOCYTES # BLD AUTO: 2.04 10*3/MM3 (ref 0.7–3.1)
LYMPHOCYTES NFR BLD AUTO: 22.4 % (ref 19.6–45.3)
MCH RBC QN AUTO: 32.4 PG (ref 26.6–33)
MCHC RBC AUTO-ENTMCNC: 33.3 G/DL (ref 31.5–35.7)
MCV RBC AUTO: 97.1 FL (ref 79–97)
MONOCYTES # BLD AUTO: 0.69 10*3/MM3 (ref 0.1–0.9)
MONOCYTES NFR BLD AUTO: 7.6 % (ref 5–12)
NEUTROPHILS NFR BLD AUTO: 5.81 10*3/MM3 (ref 1.7–7)
NEUTROPHILS NFR BLD AUTO: 63.9 % (ref 42.7–76)
NITRITE UR QL STRIP: POSITIVE
NRBC BLD AUTO-RTO: 0 /100 WBC (ref 0–0.2)
PH UR STRIP.AUTO: 6 [PH] (ref 5–8)
PLATELET # BLD AUTO: 309 10*3/MM3 (ref 140–450)
PMV BLD AUTO: 9.9 FL (ref 6–12)
POTASSIUM SERPL-SCNC: 3.1 MMOL/L (ref 3.5–5.2)
PROT SERPL-MCNC: 6.5 G/DL (ref 6–8.5)
PROT UR QL STRIP: NEGATIVE
RBC # BLD AUTO: 4.76 10*6/MM3 (ref 3.77–5.28)
RBC # UR STRIP: ABNORMAL /HPF
REF LAB TEST METHOD: ABNORMAL
RSV RNA NPH QL NAA+NON-PROBE: NOT DETECTED
SARS-COV-2 RNA RESP QL NAA+PROBE: NOT DETECTED
SODIUM SERPL-SCNC: 140 MMOL/L (ref 136–145)
SP GR UR STRIP: 1.02 (ref 1–1.03)
SQUAMOUS #/AREA URNS HPF: ABNORMAL /HPF
UROBILINOGEN UR QL STRIP: ABNORMAL
WBC # UR STRIP: ABNORMAL /HPF
WBC NRBC COR # BLD AUTO: 9.09 10*3/MM3 (ref 3.4–10.8)
WHOLE BLOOD HOLD COAG: NORMAL
WHOLE BLOOD HOLD SPECIMEN: NORMAL

## 2024-12-18 PROCEDURE — 87088 URINE BACTERIA CULTURE: CPT | Performed by: EMERGENCY MEDICINE

## 2024-12-18 PROCEDURE — 25010000002 ONDANSETRON PER 1 MG: Performed by: EMERGENCY MEDICINE

## 2024-12-18 PROCEDURE — 25010000002 ENOXAPARIN PER 10 MG: Performed by: STUDENT IN AN ORGANIZED HEALTH CARE EDUCATION/TRAINING PROGRAM

## 2024-12-18 PROCEDURE — 85025 COMPLETE CBC W/AUTO DIFF WBC: CPT | Performed by: EMERGENCY MEDICINE

## 2024-12-18 PROCEDURE — 87186 SC STD MICRODIL/AGAR DIL: CPT | Performed by: EMERGENCY MEDICINE

## 2024-12-18 PROCEDURE — 25510000001 IOPAMIDOL PER 1 ML: Performed by: EMERGENCY MEDICINE

## 2024-12-18 PROCEDURE — 83690 ASSAY OF LIPASE: CPT | Performed by: EMERGENCY MEDICINE

## 2024-12-18 PROCEDURE — 74177 CT ABD & PELVIS W/CONTRAST: CPT

## 2024-12-18 PROCEDURE — 25010000002 CEFTRIAXONE PER 250 MG: Performed by: EMERGENCY MEDICINE

## 2024-12-18 PROCEDURE — 87040 BLOOD CULTURE FOR BACTERIA: CPT | Performed by: EMERGENCY MEDICINE

## 2024-12-18 PROCEDURE — 25810000003 SODIUM CHLORIDE 0.9 % SOLUTION: Performed by: EMERGENCY MEDICINE

## 2024-12-18 PROCEDURE — 99285 EMERGENCY DEPT VISIT HI MDM: CPT

## 2024-12-18 PROCEDURE — 80053 COMPREHEN METABOLIC PANEL: CPT | Performed by: EMERGENCY MEDICINE

## 2024-12-18 PROCEDURE — 36415 COLL VENOUS BLD VENIPUNCTURE: CPT

## 2024-12-18 PROCEDURE — 87637 SARSCOV2&INF A&B&RSV AMP PRB: CPT | Performed by: EMERGENCY MEDICINE

## 2024-12-18 PROCEDURE — 25010000002 ACETAMINOPHEN 10 MG/ML SOLUTION: Performed by: EMERGENCY MEDICINE

## 2024-12-18 PROCEDURE — 83605 ASSAY OF LACTIC ACID: CPT | Performed by: EMERGENCY MEDICINE

## 2024-12-18 PROCEDURE — 81001 URINALYSIS AUTO W/SCOPE: CPT | Performed by: EMERGENCY MEDICINE

## 2024-12-18 PROCEDURE — 87086 URINE CULTURE/COLONY COUNT: CPT | Performed by: EMERGENCY MEDICINE

## 2024-12-18 RX ORDER — ASPIRIN 325 MG
325 TABLET ORAL DAILY
Status: DISCONTINUED | OUTPATIENT
Start: 2024-12-18 | End: 2024-12-21 | Stop reason: HOSPADM

## 2024-12-18 RX ORDER — OXYCODONE AND ACETAMINOPHEN 5; 325 MG/1; MG/1
1 TABLET ORAL EVERY 8 HOURS PRN
Status: DISCONTINUED | OUTPATIENT
Start: 2024-12-18 | End: 2024-12-21 | Stop reason: HOSPADM

## 2024-12-18 RX ORDER — ACETAMINOPHEN 160 MG/5ML
650 SOLUTION ORAL EVERY 4 HOURS PRN
Status: DISCONTINUED | OUTPATIENT
Start: 2024-12-18 | End: 2024-12-21 | Stop reason: HOSPADM

## 2024-12-18 RX ORDER — NALOXONE HCL 0.4 MG/ML
0.4 VIAL (ML) INJECTION
Status: DISCONTINUED | OUTPATIENT
Start: 2024-12-18 | End: 2024-12-21 | Stop reason: HOSPADM

## 2024-12-18 RX ORDER — ATORVASTATIN CALCIUM 10 MG/1
10 TABLET, FILM COATED ORAL DAILY
Status: DISCONTINUED | OUTPATIENT
Start: 2024-12-18 | End: 2024-12-21 | Stop reason: HOSPADM

## 2024-12-18 RX ORDER — SODIUM CHLORIDE 0.9 % (FLUSH) 0.9 %
10 SYRINGE (ML) INJECTION AS NEEDED
Status: DISCONTINUED | OUTPATIENT
Start: 2024-12-18 | End: 2024-12-21 | Stop reason: HOSPADM

## 2024-12-18 RX ORDER — MORPHINE SULFATE 2 MG/ML
1 INJECTION, SOLUTION INTRAMUSCULAR; INTRAVENOUS EVERY 4 HOURS PRN
Status: DISCONTINUED | OUTPATIENT
Start: 2024-12-18 | End: 2024-12-21 | Stop reason: HOSPADM

## 2024-12-18 RX ORDER — ACETAMINOPHEN 325 MG/1
650 TABLET ORAL EVERY 4 HOURS PRN
Status: DISCONTINUED | OUTPATIENT
Start: 2024-12-18 | End: 2024-12-21 | Stop reason: HOSPADM

## 2024-12-18 RX ORDER — ALUMINA, MAGNESIA, AND SIMETHICONE 2400; 2400; 240 MG/30ML; MG/30ML; MG/30ML
15 SUSPENSION ORAL EVERY 6 HOURS PRN
Status: DISCONTINUED | OUTPATIENT
Start: 2024-12-18 | End: 2024-12-21 | Stop reason: HOSPADM

## 2024-12-18 RX ORDER — POTASSIUM CHLORIDE 1500 MG/1
1 TABLET, EXTENDED RELEASE ORAL DAILY
COMMUNITY

## 2024-12-18 RX ORDER — VIT A/VIT C/VIT E/ZINC/COPPER 4296-226
1 CAPSULE ORAL DAILY
COMMUNITY

## 2024-12-18 RX ORDER — ENOXAPARIN SODIUM 100 MG/ML
40 INJECTION SUBCUTANEOUS DAILY
Status: DISCONTINUED | OUTPATIENT
Start: 2024-12-18 | End: 2024-12-21 | Stop reason: HOSPADM

## 2024-12-18 RX ORDER — ACETAMINOPHEN 10 MG/ML
1000 INJECTION, SOLUTION INTRAVENOUS ONCE
Status: COMPLETED | OUTPATIENT
Start: 2024-12-18 | End: 2024-12-18

## 2024-12-18 RX ORDER — ACETAMINOPHEN 650 MG/1
650 SUPPOSITORY RECTAL EVERY 4 HOURS PRN
Status: DISCONTINUED | OUTPATIENT
Start: 2024-12-18 | End: 2024-12-21 | Stop reason: HOSPADM

## 2024-12-18 RX ORDER — BISACODYL 5 MG/1
5 TABLET, DELAYED RELEASE ORAL DAILY PRN
Status: DISCONTINUED | OUTPATIENT
Start: 2024-12-18 | End: 2024-12-21 | Stop reason: HOSPADM

## 2024-12-18 RX ORDER — IOPAMIDOL 755 MG/ML
100 INJECTION, SOLUTION INTRAVASCULAR
Status: COMPLETED | OUTPATIENT
Start: 2024-12-18 | End: 2024-12-18

## 2024-12-18 RX ORDER — ALPRAZOLAM 0.25 MG/1
0.5 TABLET ORAL EVERY 8 HOURS PRN
Status: DISCONTINUED | OUTPATIENT
Start: 2024-12-18 | End: 2024-12-21 | Stop reason: HOSPADM

## 2024-12-18 RX ORDER — PAROXETINE 20 MG/1
40 TABLET, FILM COATED ORAL EVERY MORNING
Status: DISCONTINUED | OUTPATIENT
Start: 2024-12-19 | End: 2024-12-21 | Stop reason: HOSPADM

## 2024-12-18 RX ORDER — POTASSIUM CHLORIDE 750 MG/1
40 CAPSULE, EXTENDED RELEASE ORAL ONCE
Status: COMPLETED | OUTPATIENT
Start: 2024-12-18 | End: 2024-12-18

## 2024-12-18 RX ORDER — POLYETHYLENE GLYCOL 3350 17 G/17G
17 POWDER, FOR SOLUTION ORAL DAILY PRN
Status: DISCONTINUED | OUTPATIENT
Start: 2024-12-18 | End: 2024-12-21 | Stop reason: HOSPADM

## 2024-12-18 RX ORDER — AMOXICILLIN 250 MG
2 CAPSULE ORAL 2 TIMES DAILY PRN
Status: DISCONTINUED | OUTPATIENT
Start: 2024-12-18 | End: 2024-12-21 | Stop reason: HOSPADM

## 2024-12-18 RX ORDER — OXYBUTYNIN CHLORIDE 5 MG/1
1 TABLET ORAL EVERY 12 HOURS SCHEDULED
COMMUNITY
Start: 2024-11-07

## 2024-12-18 RX ORDER — FAMOTIDINE 20 MG/1
40 TABLET, FILM COATED ORAL DAILY
Status: DISCONTINUED | OUTPATIENT
Start: 2024-12-18 | End: 2024-12-21 | Stop reason: HOSPADM

## 2024-12-18 RX ORDER — BISACODYL 10 MG
10 SUPPOSITORY, RECTAL RECTAL DAILY PRN
Status: DISCONTINUED | OUTPATIENT
Start: 2024-12-18 | End: 2024-12-21 | Stop reason: HOSPADM

## 2024-12-18 RX ORDER — ONDANSETRON 2 MG/ML
4 INJECTION INTRAMUSCULAR; INTRAVENOUS EVERY 6 HOURS PRN
Status: DISCONTINUED | OUTPATIENT
Start: 2024-12-18 | End: 2024-12-21 | Stop reason: HOSPADM

## 2024-12-18 RX ORDER — ONDANSETRON 2 MG/ML
4 INJECTION INTRAMUSCULAR; INTRAVENOUS ONCE
Status: COMPLETED | OUTPATIENT
Start: 2024-12-18 | End: 2024-12-18

## 2024-12-18 RX ADMIN — ACETAMINOPHEN 1000 MG: 10 INJECTION, SOLUTION INTRAVENOUS at 10:10

## 2024-12-18 RX ADMIN — SODIUM CHLORIDE 500 ML: 9 INJECTION, SOLUTION INTRAVENOUS at 10:10

## 2024-12-18 RX ADMIN — IOPAMIDOL 100 ML: 755 INJECTION, SOLUTION INTRAVENOUS at 09:58

## 2024-12-18 RX ADMIN — ENOXAPARIN SODIUM 40 MG: 100 INJECTION SUBCUTANEOUS at 15:24

## 2024-12-18 RX ADMIN — POTASSIUM CHLORIDE 40 MEQ: 750 CAPSULE, EXTENDED RELEASE ORAL at 10:14

## 2024-12-18 RX ADMIN — ASPIRIN 325 MG: 325 TABLET ORAL at 15:24

## 2024-12-18 RX ADMIN — FAMOTIDINE 40 MG: 20 TABLET ORAL at 15:24

## 2024-12-18 RX ADMIN — ATORVASTATIN CALCIUM 10 MG: 10 TABLET, FILM COATED ORAL at 15:24

## 2024-12-18 RX ADMIN — ONDANSETRON 4 MG: 2 INJECTION INTRAMUSCULAR; INTRAVENOUS at 10:01

## 2024-12-18 RX ADMIN — SODIUM CHLORIDE 1000 MG: 9 INJECTION INTRAMUSCULAR; INTRAVENOUS; SUBCUTANEOUS at 10:02

## 2024-12-18 NOTE — ED NOTES
ED to Inpatient Report    PATIENT DEMOGRAPHICS  Cristina Pina   1935  88 y.o.  female  Allergies   Allergen Reactions    Ciprofibrate Hives    Sulfa Antibiotics Hives          12/18/24  0806   Weight: 89.4 kg (197 lb 1.5 oz)      There are no questions and answers to display.        HISTORY  Past Medical History:   Diagnosis Date    Depression     Hypertension       Past Surgical History:   Procedure Laterality Date    CARDIAC SURGERY      CHOLECYSTECTOMY      EYE SURGERY      HYSTERECTOMY      KNEE CLOSED REDUCTION      TOTAL KNEE ARTHROPLASTY       Prior to Admission medications    Medication Sig Start Date End Date Taking? Authorizing Provider   albuterol sulfate  (90 Base) MCG/ACT inhaler Inhale 2 puffs Every 4 (Four) Hours As Needed for Wheezing.   Yes Emergency, Nurse Epic, RN   aspirin 325 MG tablet Take 1 tablet by mouth Daily.   Yes Emergency, Nurse Epic, RN   Multiple Vitamins-Minerals (PreserVision AREDS) capsule Take 1 capsule by mouth Daily.   Yes Anjali Cantu MD   multivitamin with minerals tablet tablet Take 1 tablet by mouth Daily.   Yes Emergency, Nurse Aleida RN   oxybutynin (DITROPAN) 5 MG tablet Take 1 tablet by mouth Every 12 (Twelve) Hours. 11/7/24  Yes ProviderAnjali MD   Paxil 40 MG tablet Take 1 tablet by mouth Every Morning.   Yes ProviderAnjali MD   potassium chloride (KLOR-CON M20) 20 MEQ CR tablet Take 1 tablet by mouth Daily.   Yes ProviderAnjali MD   simvastatin (ZOCOR) 20 MG tablet Take 1 tablet by mouth Daily.   Yes Emergency, Nurse Aleida RN   levothyroxine (SYNTHROID, LEVOTHROID) 125 MCG tablet Take 1 tablet by mouth Daily.  12/18/24 Yes Emergency, Nurse Epic, RN   losartan (COZAAR) 50 MG tablet Take 1 tablet by mouth 2 (Two) Times a Day for 30 days. 8/14/22 12/18/24  Felice Guillermo MD   nitroglycerin (NITROSTAT) 0.4 MG SL tablet Place 1 tablet under the tongue Every 5 (Five) Minutes As Needed for Chest Pain (Only if SBP Greater Than  "100). Take no more than 3 doses in 15 minutes. 8/14/22 12/18/24  Felice Guillermo MD   Omega-3 1000 MG capsule Take 1 capsule by mouth Daily.  12/18/24  Emergency, Nurse MARIELLA Shin   oxybutynin XL (DITROPAN-XL) 5 MG 24 hr tablet Take 1 tablet by mouth Daily.  12/18/24  Emergency, Nurse MARIELLA Shin   PARoxetine (PAXIL) 20 MG tablet Take 1 tablet by mouth Every Morning.  12/18/24  Emergency, Nurse MARIELLA Shin   potassium chloride (MICRO-K) 10 MEQ CR capsule Take 2 capsules by mouth 2 (Two) Times a Day With Meals. 8/14/22 12/18/24  Felice Guillermo MD        [facility]  Chief Complaint   Patient presents with    Weakness - Generalized    Nausea    Vomiting    Back Pain      Diagnosis Plan   1. Generalized weakness        2. Acute UTI (urinary tract infection)        3. Acute right-sided low back pain without sciatica        4. Fall, initial encounter        5. Compression fracture of L3 vertebra, initial encounter        6. Hypokalemia           Teri Paez MD  Vitals:    12/18/24 0750 12/18/24 0806 12/18/24 1100 12/18/24 1145   BP: 137/94  144/89 118/63   BP Location: Left arm  Left arm Left arm   Patient Position: Lying  Lying Lying   Pulse: 69  58 58   Resp: 20  21 20   Temp:  99.1 °F (37.3 °C) 99 °F (37.2 °C) 98.9 °F (37.2 °C)   TempSrc:  Rectal Rectal Oral   SpO2: 91%  94% 90%   Weight:  89.4 kg (197 lb 1.5 oz)     Height:  170.2 cm (67\")       Results for orders placed or performed during the hospital encounter of 12/18/24   Urinalysis With Microscopic If Indicated (No Culture) - Urine, Clean Catch    Collection Time: 12/18/24  8:13 AM    Specimen: Urine, Clean Catch   Result Value Ref Range    Color, UA Yellow Yellow, Straw    Appearance, UA Cloudy (A) Clear    pH, UA 6.0 5.0 - 8.0    Specific Gravity, UA 1.017 1.005 - 1.030    Glucose, UA Negative Negative    Ketones, UA Trace (A) Negative    Bilirubin, UA Negative Negative    Blood, UA Negative Negative    Protein, UA Negative Negative    Leuk Esterase, UA Moderate " (2+) (A) Negative    Nitrite, UA Positive (A) Negative    Urobilinogen, UA 1.0 E.U./dL 0.2 - 1.0 E.U./dL   Urinalysis, Microscopic Only - Urine, Clean Catch    Collection Time: 12/18/24  8:13 AM    Specimen: Urine, Clean Catch   Result Value Ref Range    RBC, UA None Seen None Seen, 0-2 /HPF    WBC, UA 11-20 (A) None Seen, 0-2 /HPF    Bacteria, UA 3+ (A) None Seen /HPF    Squamous Epithelial Cells, UA None Seen None Seen, 0-2 /HPF    Hyaline Casts, UA None Seen None Seen /LPF    Methodology Manual Light Microscopy    Comprehensive Metabolic Panel    Collection Time: 12/18/24  8:24 AM    Specimen: Arm, Left; Blood   Result Value Ref Range    Glucose 138 (H) 65 - 99 mg/dL    BUN 16 8 - 23 mg/dL    Creatinine 0.82 0.57 - 1.00 mg/dL    Sodium 140 136 - 145 mmol/L    Potassium 3.1 (L) 3.5 - 5.2 mmol/L    Chloride 98 98 - 107 mmol/L    CO2 26.9 22.0 - 29.0 mmol/L    Calcium 9.0 8.6 - 10.5 mg/dL    Total Protein 6.5 6.0 - 8.5 g/dL    Albumin 3.6 3.5 - 5.2 g/dL    ALT (SGPT) 29 1 - 33 U/L    AST (SGOT) 37 (H) 1 - 32 U/L    Alkaline Phosphatase 83 39 - 117 U/L    Total Bilirubin 0.7 0.0 - 1.2 mg/dL    Globulin 2.9 gm/dL    A/G Ratio 1.2 g/dL    BUN/Creatinine Ratio 19.5 7.0 - 25.0    Anion Gap 15.1 (H) 5.0 - 15.0 mmol/L    eGFR 69.8 >60.0 mL/min/1.73   Lipase    Collection Time: 12/18/24  8:24 AM    Specimen: Arm, Left; Blood   Result Value Ref Range    Lipase 25 13 - 60 U/L   Lactic Acid, Plasma    Collection Time: 12/18/24  8:24 AM    Specimen: Arm, Right; Blood   Result Value Ref Range    Lactate 2.3 (C) 0.5 - 2.0 mmol/L   CBC Auto Differential    Collection Time: 12/18/24  8:24 AM    Specimen: Arm, Left; Blood   Result Value Ref Range    WBC 9.09 3.40 - 10.80 10*3/mm3    RBC 4.76 3.77 - 5.28 10*6/mm3    Hemoglobin 15.4 12.0 - 15.9 g/dL    Hematocrit 46.2 34.0 - 46.6 %    MCV 97.1 (H) 79.0 - 97.0 fL    MCH 32.4 26.6 - 33.0 pg    MCHC 33.3 31.5 - 35.7 g/dL    RDW 13.2 12.3 - 15.4 %    RDW-SD 47.0 37.0 - 54.0 fl    MPV 9.9  6.0 - 12.0 fL    Platelets 309 140 - 450 10*3/mm3    Neutrophil % 63.9 42.7 - 76.0 %    Lymphocyte % 22.4 19.6 - 45.3 %    Monocyte % 7.6 5.0 - 12.0 %    Eosinophil % 4.2 0.3 - 6.2 %    Basophil % 0.8 0.0 - 1.5 %    Immature Grans % 1.1 (H) 0.0 - 0.5 %    Neutrophils, Absolute 5.81 1.70 - 7.00 10*3/mm3    Lymphocytes, Absolute 2.04 0.70 - 3.10 10*3/mm3    Monocytes, Absolute 0.69 0.10 - 0.90 10*3/mm3    Eosinophils, Absolute 0.38 0.00 - 0.40 10*3/mm3    Basophils, Absolute 0.07 0.00 - 0.20 10*3/mm3    Immature Grans, Absolute 0.10 (H) 0.00 - 0.05 10*3/mm3    nRBC 0.0 0.0 - 0.2 /100 WBC   Green Top (Gel)    Collection Time: 12/18/24  8:24 AM   Result Value Ref Range    Extra Tube Hold for add-ons.    Lavender Top    Collection Time: 12/18/24  8:24 AM   Result Value Ref Range    Extra Tube hold for add-on    Gold Top - SST    Collection Time: 12/18/24  8:24 AM   Result Value Ref Range    Extra Tube Hold for add-ons.    Light Blue Top    Collection Time: 12/18/24  8:24 AM   Result Value Ref Range    Extra Tube Hold for add-ons.    COVID-19, FLU A/B, RSV PCR 1 HR TAT - Swab, Nasopharynx    Collection Time: 12/18/24  8:30 AM    Specimen: Nasopharynx; Swab   Result Value Ref Range    COVID19 Not Detected Not Detected - Ref. Range    Influenza A PCR Not Detected Not Detected    Influenza B PCR Not Detected Not Detected    RSV, PCR Not Detected Not Detected      CT Abdomen Pelvis With Contrast   Final Result   Impression:   1.There is a moderate biconcave compression fracture of L3 which appears acute. Please correlate clinically for pain/tenderness at this site.   2.Urinary bladder is decompressed which limits its evaluation. There is mild air within the bladder lumen which may be related to recent instrumentation. Please correlate with urinalysis to exclude urinary tract infection.   3.Large hiatal hernia is partially visualized and contains most of the stomach and a portion of transverse colon.   4.Colonic  diverticulosis.   5.Suspect hepatic steatosis.   6.Additional findings as detailed above.            Electronically Signed: Harmeet Lund MD     12/18/2024 10:18 AM EST     Workstation ID: [id number]        Lines, Drains & Airways       Active LDAs       Name Placement date Placement time Site Days    Peripheral IV 12/18/24 0824 Left Antecubital 12/18/24 0824  Antecubital  less than 1                  Medications   sodium chloride 0.9 % flush 10 mL (has no administration in time range)   sodium chloride 0.9 % bolus 500 mL (0 mL Intravenous Stopped 12/18/24 1040)   ondansetron (ZOFRAN) injection 4 mg (4 mg Intravenous Given 12/18/24 1001)   acetaminophen (OFIRMEV) injection 1,000 mg (1,000 mg Intravenous Given 12/18/24 1010)   cefTRIAXone (ROCEPHIN) in NS 1 gram/10ml IV PUSH syringe (1,000 mg Intravenous Given 12/18/24 1002)   iopamidol (ISOVUE-370) 76 % injection 100 mL (100 mL Intravenous Given 12/18/24 0958)   potassium chloride (MICRO-K/KLOR-CON) CR capsule (40 mEq Oral Given 12/18/24 1014)      No orders to display        Neda Mccarty RN  12/18/24 11:58 EST

## 2024-12-18 NOTE — ED NOTES
Patient to ED from home via [facility] for generalized weakness x 4 days. Patient also reports back pain in the lumbar region from an unwitnessed fall that also occurred 4 days ago. Patients son states she is unable to ambulate due to the fall, has had n/v/d today.   Patient was treated for pneumonia Wednesday of last week with antibiotics, steroid.

## 2024-12-18 NOTE — H&P
[facility]   HISTORY AND PHYSICAL    Patient Name: Cristina Pina  : 1935  MRN: [id number]  Primary Care Physician:  Michael Serrano MD  Date of admission: 2024    Subjective   Subjective     Chief Complaint: Generalized weakness    HPI:    Cristina Pina is a 88 y.o. female 88-year-old female with past medical history of overactive bladder, dementia hyperlipidemia, anxiety/depression comes into the hospital due to generalized weakness that has been going on for the last 3 to 4 days.  This is also been associated with an unwitnessed fall and since then has had difficulty ambulating.  She also has have been having increased nausea and vomiting.  Patient was treated about a week ago for pneumonia with antibiotics.  Per patient's family they have been unable to take care of the patient due to the fall and difficulty ambulating.  They have also noticed that she is progressively weaker than her normal state.  Due to those reason patient presented the ER.    Patient's blood pressures and vitals were stable.  Her chemistries are normal except for a potassium of 3.1.  Her CBC is normal.  Her lactate was mild elevated at 2.3.  Her UA is consistent with UTI.  Patient was given IV fluids with improvement in lactate.  She was given antibiotics.  CT abdomen pelvis shows compression fracture of L3 which is acute.  Noted to also have large hiatal hernia.  Patient mated for further management of symptoms     Review of Systems  All review of systems negative except as in subjective complaints:  Personal History     Past Medical History:   Diagnosis Date    Depression     Hypertension        Past Surgical History:   Procedure Laterality Date    CARDIAC SURGERY      CHOLECYSTECTOMY      EYE SURGERY      HYSTERECTOMY      KNEE CLOSED REDUCTION      TOTAL KNEE ARTHROPLASTY         Family History: family history is not on file. Otherwise pertinent FHx was reviewed and not pertinent to current issue.    Social  History:  reports that she has never smoked. She has never used smokeless tobacco. She reports that she does not currently use alcohol. She reports that she does not use drugs.    Home Medications:  PARoxetine, PreserVision AREDS, albuterol sulfate HFA, aspirin, multivitamin with minerals, oxybutynin, potassium chloride, and simvastatin      Allergies:  Allergies   Allergen Reactions    Ciprofibrate Hives    Sulfa Antibiotics Hives       Objective   Objective     Vitals:   Temp:  [97.7 °F (36.5 °C)-99.1 °F (37.3 °C)] 98.1 °F (36.7 °C)  Heart Rate:  [57-70] 70  Resp:  [18-21] 18  BP: (116-147)/(63-94) 116/69  Physical Exam               Constitutional:         Awake, alert responsive, conversant, no obvious distress   Eyes:                       PERRLA, sclerae anicteric, no conjunctival injection   HEENT:                   Moist mucous membranes, no nasal or eye discharge, no throat congestion   Neck:                      Supple, no thyromegaly, no lymphadenopathy, trachea midline, no elevated JVD   Respiratory:           Clear to auscultation bilaterally, nonlabored respirations    Cardiovascular:     RRR, no murmurs, rubs, or gallops, palpable pedal pulses bilaterally,No bilateral ankle edema   Gastrointestinal:   Positive bowel sounds, soft, nontender, nondistended, no organomegaly   Musculoskeletal:   No clubbing or cyanosis to extremities, muscle wasting, joint swelling, muscle weakness   Psychiatric:             Appropriate affect, cooperative   Neurologic:            Awake alert ,oriented x 3, strength symmetric in all extremities, Cranial Nerves grossly intact to confrontation, speech clear   Skin:                      No rashes, bruising, skin ulcers, petechiae or ecchymosis    Result Review    Result Review:  I have personally reviewed the results from the time of this admission to 12/18/2024 19:18 EST and agree with these findings:  []  Laboratory  []  Microbiology  []  Radiology  []  EKG/Telemetry   []   Cardiology/Vascular   []  Pathology  []  Old records  []  Other:    Results from last 7 days   Lab Units 12/18/24  0824   WBC 10*3/mm3 9.09   HEMOGLOBIN g/dL 15.4   PLATELETS 10*3/mm3 309     Results from last 7 days   Lab Units 12/18/24  0824   SODIUM mmol/L 140   POTASSIUM mmol/L 3.1*   CHLORIDE mmol/L 98   CO2 mmol/L 26.9   ANION GAP mmol/L 15.1*   BUN mg/dL 16   CREATININE mg/dL 0.82   GLUCOSE mg/dL 138*         Assessment & Plan   Assessment / Plan     Active Hospital Problems:  Active Hospital Problems    Diagnosis     **Generalized weakness     UTI (urinary tract infection)     Compression fracture of L3 vertebra      88-year-old female with past medical history of overactive bladder, dementia hyperlipidemia, anxiety/depression comes into the hospital due to generalized weakness that has been going on for the last 3 to 4 days complicated with a fall and acute L3-L4 compression fracture and UA concerning for UTI and inability to walk with family having a hard time caring to the patient    Plan:   Neurosurgery was consulted.  Conservative management with use of brace.  Appreciate the help in managing the patient  UA concerning for UTI and will continue with antibiotics for 5 days.  Cultures are pending  PT OT   to help with placement.    VTE Prophylaxis:  Pharmacologic VTE prophylaxis orders are present.        CODE STATUS:    Code Status (Patient has no pulse and is not breathing): CPR (Attempt to Resuscitate)  Medical Interventions (Patient has pulse or is breathing): Full Support    Admission Status:  I believe this patient meets inpatient status.    Electronically signed by Teri Paez MD, 12/18/24, 1:09 PM EST.

## 2024-12-18 NOTE — ED PROVIDER NOTES
Time: 11:32 AM EST  Date of encounter:  12/18/2024  Independent Historian/Clinical History and Information was obtained by:   Patient and Family    History is limited by: Dementia    Chief Complaint: Fall weakness nausea      History of Present Illness:  Patient is a 88 y.o. year old female with history of hypertension who presents to the emergency department for evaluation of generalized weakness for the past few days, nausea and decreased p.o. intake, just had a fall the other day at home and has had moderately severe lower back pain ever since.    Patient's family member at the bedside who is her primary caregiver reports that she has been weaker than usual, and now with so much back pain she has not been able to get up and assist him with getting her out of bed and essentially is unable to take care of her now.    No fever reported but some generalized malaise noted.  No cough or congestion.      Patient Care Team  Primary Care Provider: Michael Serrano MD    Past Medical History:     Allergies   Allergen Reactions    Ciprofibrate Hives    Sulfa Antibiotics Hives     Past Medical History:   Diagnosis Date    Depression     Hypertension      Past Surgical History:   Procedure Laterality Date    CARDIAC SURGERY      CHOLECYSTECTOMY      EYE SURGERY      HYSTERECTOMY      KNEE CLOSED REDUCTION      TOTAL KNEE ARTHROPLASTY       History reviewed. No pertinent family history.    Home Medications:  Prior to Admission medications    Medication Sig Start Date End Date Taking? Authorizing Provider   albuterol sulfate  (90 Base) MCG/ACT inhaler Inhale 2 puffs Every 4 (Four) Hours As Needed for Wheezing.   Yes Emergency, Nurse Epic, RN   aspirin 325 MG tablet Take 1 tablet by mouth Daily.   Yes Emergency, Nurse Epic, RN   Multiple Vitamins-Minerals (PreserVision AREDS) capsule Take 1 capsule by mouth Daily.   Yes Provider, MD Anjali   multivitamin with minerals tablet tablet Take 1 tablet by mouth Daily.   " Yes Emergency, Nurse MARIELLA Shin   oxybutynin (DITROPAN) 5 MG tablet Take 1 tablet by mouth Every 12 (Twelve) Hours. 11/7/24  Yes Provider, MD Anjali   Paxil 40 MG tablet Take 1 tablet by mouth Every Morning.   Yes Provider, MD Anjali   potassium chloride (KLOR-CON M20) 20 MEQ CR tablet Take 1 tablet by mouth Daily.   Yes Provider, MD Anjali   simvastatin (ZOCOR) 20 MG tablet Take 1 tablet by mouth Daily.   Yes Emergency, Nurse MARIELLA Shin   levothyroxine (SYNTHROID, LEVOTHROID) 125 MCG tablet Take 1 tablet by mouth Daily.  12/18/24 Yes Emergency, Nurse Epic, RN   losartan (COZAAR) 50 MG tablet Take 1 tablet by mouth 2 (Two) Times a Day for 30 days. 8/14/22 12/18/24  Felice Guillermo MD   nitroglycerin (NITROSTAT) 0.4 MG SL tablet Place 1 tablet under the tongue Every 5 (Five) Minutes As Needed for Chest Pain (Only if SBP Greater Than 100). Take no more than 3 doses in 15 minutes. 8/14/22 12/18/24  Felice Guillermo MD   Omega-3 1000 MG capsule Take 1 capsule by mouth Daily.  12/18/24  Emergency, Nurse Aleida RN   oxybutynin XL (DITROPAN-XL) 5 MG 24 hr tablet Take 1 tablet by mouth Daily.  12/18/24  Emergency, Nurse Aleida RN   PARoxetine (PAXIL) 20 MG tablet Take 1 tablet by mouth Every Morning.  12/18/24  Emergency, Nurse Aleida RN   potassium chloride (MICRO-K) 10 MEQ CR capsule Take 2 capsules by mouth 2 (Two) Times a Day With Meals. 8/14/22 12/18/24  Felice Guillermo MD        Social History:   Social History     Tobacco Use    Smoking status: Never    Smokeless tobacco: Never   Vaping Use    Vaping status: Never Used   Substance Use Topics    Alcohol use: Not Currently    Drug use: Never         Review of Systems:  Review of Systems   I performed a 10 point review of systems which was all negative, except for the positives found in the HPI above.  Physical Exam:  /94 (BP Location: Left arm, Patient Position: Lying)   Pulse 69   Temp 99.1 °F (37.3 °C) (Rectal)   Resp 20   Ht 170.2 cm (67\")   Wt " 89.4 kg (197 lb 1.5 oz)   SpO2 91%   BMI 30.87 kg/m²     Physical Exam   General: Awake alert and in no obvious distress    HEENT: Head normocephalic atraumatic, eyes PERRLA EOMI, nose normal, oropharynx normal.    Neck: Supple full range of motion, no meningismus, no lymphadenopathy    Heart: Regular rate and rhythm, no murmurs or rubs, 2+ radial pulses bilaterally    Lungs: Clear to auscultation bilaterally without wheezes or crackles, no respiratory distress    Abdomen: Soft, mild suprapubic tenderness, nondistended, no rebound or guarding    Back exam: She has reproducible tenderness over the lumbar spine and over the right lumbar paraspinal muscles but no visual abnormalities    Skin: Warm, dry, no rash    Musculoskeletal: Normal range of motion, no lower extremity edema    Neurologic: Oriented x3, no motor deficits no sensory deficits    Psychiatric: Mood appears stable, no psychosis            Medical Decision Making:      Comorbidities that affect care:    Advanced age, mild dementia    External Notes reviewed:    None      The following orders were placed and all results were independently analyzed by me:  Orders Placed This Encounter   Procedures    Blood Culture - Blood,    Blood Culture - Blood,    COVID PRE-OP / PRE-PROCEDURE SCREENING ORDER (NO ISOLATION) - Swab, Nasopharynx    COVID-19, FLU A/B, RSV PCR 1 HR TAT - Swab, Nasopharynx    Urine Culture - Urine,    CT Abdomen Pelvis With Contrast    [place] Draw    Comprehensive Metabolic Panel    Lipase    Urinalysis With Microscopic If Indicated (No Culture) - Urine, Clean Catch    Lactic Acid, Plasma    CBC Auto Differential    Urinalysis, Microscopic Only - Urine, Clean Catch    STAT Lactic Acid, Reflex    NPO Diet NPO Type: Strict NPO    Undress & Gown    Brace Application    Obtain & Apply The Following- Back/Torso; Back brace (hard)    IP General Consult (Use specialty-specific consult if known)    Nephrology  (on-call MD unless specified)     Insert Peripheral IV    Inpatient Admission    CBC & Differential    Green Top (Gel)    Lavender Top    Gold Top - SST    Light Blue Top       Medications Given in the Emergency Department:  Medications   sodium chloride 0.9 % flush 10 mL (has no administration in time range)   sodium chloride 0.9 % bolus 500 mL (0 mL Intravenous Stopped 12/18/24 1040)   ondansetron (ZOFRAN) injection 4 mg (4 mg Intravenous Given 12/18/24 1001)   acetaminophen (OFIRMEV) injection 1,000 mg (1,000 mg Intravenous Given 12/18/24 1010)   cefTRIAXone (ROCEPHIN) in NS 1 gram/10ml IV PUSH syringe (1,000 mg Intravenous Given 12/18/24 1002)   iopamidol (ISOVUE-370) 76 % injection 100 mL (100 mL Intravenous Given 12/18/24 0958)   potassium chloride (MICRO-K/KLOR-CON) CR capsule (40 mEq Oral Given 12/18/24 1014)        ED Course:         Labs:    Lab Results (last 24 hours)       Procedure Component Value Units Date/Time    Urinalysis With Microscopic If Indicated (No Culture) - Urine, Clean Catch [[id number]]  (Abnormal) Collected: 12/18/24 0813    Specimen: Urine, Clean Catch Updated: 12/18/24 0851     Color, UA Yellow     Appearance, UA Cloudy     pH, UA 6.0     Specific Gravity, UA 1.017     Glucose, UA Negative     Ketones, UA Trace     Bilirubin, UA Negative     Blood, UA Negative     Protein, UA Negative     Leuk Esterase, UA Moderate (2+)     Nitrite, UA Positive     Urobilinogen, UA 1.0 E.U./dL    Urinalysis, Microscopic Only - Urine, Clean Catch [[id number]]  (Abnormal) Collected: 12/18/24 0813    Specimen: Urine, Clean Catch Updated: 12/18/24 0908     RBC, UA None Seen /HPF      WBC, UA 11-20 /HPF      Bacteria, UA 3+ /HPF      Squamous Epithelial Cells, UA None Seen /HPF      Hyaline Casts, UA None Seen /LPF      Methodology Manual Light Microscopy    Urine Culture - Urine, Urine, Clean Catch [[id number]] Collected: 12/18/24 0813    Specimen: Urine, Clean Catch Updated: 12/18/24 0942    CBC & Differential [[id number]]  (Abnormal)  Collected: 12/18/24 0824    Specimen: Blood from Arm, Left Updated: 12/18/24 0844    Narrative:      The following orders were created for panel order CBC & Differential.  Procedure                               Abnormality         Status                     ---------                               -----------         ------                     CBC Auto Differential[[id number]]        Abnormal            Final result                 Please view results for these tests on the individual orders.    Comprehensive Metabolic Panel [[id number]]  (Abnormal) Collected: 12/18/24 0824    Specimen: Blood from Arm, Left Updated: 12/18/24 0903     Glucose 138 mg/dL      BUN 16 mg/dL      Creatinine 0.82 mg/dL      Sodium 140 mmol/L      Potassium 3.1 mmol/L      Chloride 98 mmol/L      CO2 26.9 mmol/L      Calcium 9.0 mg/dL      Total Protein 6.5 g/dL      Albumin 3.6 g/dL      ALT (SGPT) 29 U/L      AST (SGOT) 37 U/L      Alkaline Phosphatase 83 U/L      Total Bilirubin 0.7 mg/dL      Globulin 2.9 gm/dL      A/G Ratio 1.2 g/dL      BUN/Creatinine Ratio 19.5     Anion Gap 15.1 mmol/L      eGFR 69.8 mL/min/1.73     Narrative:      GFR Categories in Chronic Kidney Disease (CKD)      GFR Category          GFR (mL/min/1.73)    Interpretation  G1                     90 or greater         Normal or high (1)  G2                      60-89                Mild decrease (1)  G3a                   45-59                Mild to moderate decrease  G3b                   30-44                Moderate to severe decrease  G4                    15-29                Severe decrease  G5                    14 or less           Kidney failure          (1)In the absence of evidence of kidney disease, neither GFR category G1 or G2 fulfill the criteria for CKD.    eGFR calculation 2021 CKD-EPI creatinine equation, which does not include race as a factor    Lipase [[id number]]  (Normal) Collected: 12/18/24 0824    Specimen: Blood from Arm, Left Updated:  12/18/24 0903     Lipase 25 U/L     Lactic Acid, Plasma [[id number]]  (Abnormal) Collected: 12/18/24 0824    Specimen: Blood from Arm, Right Updated: 12/18/24 0946     Lactate 2.3 mmol/L     Blood Culture - Blood, Arm, Left [[id number]] Collected: 12/18/24 0824    Specimen: Blood from Arm, Left Updated: 12/18/24 0840    Blood Culture - Blood, Arm, Right [[id number]] Collected: 12/18/24 0824    Specimen: Blood from Arm, Right Updated: 12/18/24 0840    CBC Auto Differential [[id number]]  (Abnormal) Collected: 12/18/24 0824    Specimen: Blood from Arm, Left Updated: 12/18/24 0844     WBC 9.09 10*3/mm3      RBC 4.76 10*6/mm3      Hemoglobin 15.4 g/dL      Hematocrit 46.2 %      MCV 97.1 fL      MCH 32.4 pg      MCHC 33.3 g/dL      RDW 13.2 %      RDW-SD 47.0 fl      MPV 9.9 fL      Platelets 309 10*3/mm3      Neutrophil % 63.9 %      Lymphocyte % 22.4 %      Monocyte % 7.6 %      Eosinophil % 4.2 %      Basophil % 0.8 %      Immature Grans % 1.1 %      Neutrophils, Absolute 5.81 10*3/mm3      Lymphocytes, Absolute 2.04 10*3/mm3      Monocytes, Absolute 0.69 10*3/mm3      Eosinophils, Absolute 0.38 10*3/mm3      Basophils, Absolute 0.07 10*3/mm3      Immature Grans, Absolute 0.10 10*3/mm3      nRBC 0.0 /100 WBC     COVID PRE-OP / PRE-PROCEDURE SCREENING ORDER (NO ISOLATION) - Swab, Nasopharynx [[id number]]  (Normal) Collected: 12/18/24 0830    Specimen: Swab from Nasopharynx Updated: 12/18/24 0922    Narrative:      The following orders were created for panel order COVID PRE-OP / PRE-PROCEDURE SCREENING ORDER (NO ISOLATION) - Swab, Nasopharynx.  Procedure                               Abnormality         Status                     ---------                               -----------         ------                     COVID-19, FLU A/B, RSV P...[[id number]]  Normal              Final result                 Please view results for these tests on the individual orders.    COVID-19, FLU A/B, RSV PCR 1 HR TAT - Swab, Nasopharynx  [[id number]]  (Normal) Collected: 12/18/24 0830    Specimen: Swab from Nasopharynx Updated: 12/18/24 0922     COVID19 Not Detected     Influenza A PCR Not Detected     Influenza B PCR Not Detected     RSV, PCR Not Detected    Narrative:      Fact sheet for providers: https://www.fda.gov/media/297269/download    Fact sheet for patients: https://www.fda.gov/media/928325/download    Test performed by PCR.    STAT Lactic Acid, Reflex [[id number]] Collected: 12/18/24 1148    Specimen: Blood from Arm, Right Updated: 12/18/24 1152             Imaging:    CT Abdomen Pelvis With Contrast    Result Date: 12/18/2024  CT ABDOMEN PELVIS W CONTRAST Date of Exam: 12/18/2024 9:42 AM EST Indication: eval R flank pain, UTI; also fell recently now with low back pain. Comparison: 8/11/2022 Technique: Axial CT images were obtained of the abdomen and pelvis after the uneventful intravenous administration of iodinated contrast. Reconstructed coronal and sagittal images were also obtained. Automated exposure control and iterative construction methods were used. Findings: Liver: The liver is prominent in size and slightly decreased in attenuation. Left hepatic lobe cyst is seen. No biliary dilation is seen. Gallbladder: Surgically absent. Pancreas: Unremarkable. Spleen: Unremarkable. Adrenal glands: Unremarkable. Genitourinary tract: There are several small right renal cysts. Kidneys are otherwise unremarkable. No hydronephrosis is seen. The visualized portions of the ureters are unremarkable. Urinary bladder is decompressed which limits its evaluation. There is mild air within the bladder lumen which may be related to recent instrumentation. Status post hysterectomy. Gastrointestinal tract: Large hiatal hernia containing most of the stomach and portion of transverse colon. Colonic diverticula are seen. No findings to suggest bowel obstruction. Appendix: No findings to suggest acute appendicitis. Other findings: No free air or free fluid. No  pathologically enlarged lymph nodes are seen. Vascular calcifications are present. The IVC is unremarkable. Bones and soft tissues: There is a moderate biconcave compression fracture of L3 which appears acute. Bones are demineralized. There are degenerative changes within the spine. There are degenerative changes within both hips, right greater than left. Superficial soft tissues demonstrate no acute abnormality. Fat containing Spigelian hernia on the left. Lung bases: The visualized lung bases are clear. Large hiatal hernia is partially visualized and contains most of the stomach and a portion of transverse colon.     Impression: 1.There is a moderate biconcave compression fracture of L3 which appears acute. Please correlate clinically for pain/tenderness at this site. 2.Urinary bladder is decompressed which limits its evaluation. There is mild air within the bladder lumen which may be related to recent instrumentation. Please correlate with urinalysis to exclude urinary tract infection. 3.Large hiatal hernia is partially visualized and contains most of the stomach and a portion of transverse colon. 4.Colonic diverticulosis. 5.Suspect hepatic steatosis. 6.Additional findings as detailed above. Electronically Signed: Harmeet Lund MD  12/18/2024 10:18 AM EST  Workstation ID: [id number]       Differential Diagnosis and Discussion:    Back Pain: The patient presents with back pain. My differential diagnosis includes but is not limited to acute spinal epidural abscess, acute spinal epidural bleed, cauda equina syndrome, abdominal aortic aneurysm, aortic dissection, kidney stone, pyelonephritis, musculoskeletal back pain, spinal fracture, and osteoarthritis.   Weakness: Based on the patient's history, signs, and symptoms, the diffential diagnosis includes but is not limited to meningitis, stroke, sepsis, subarachnoid hemorrhage, intracranial bleeding, encephalitis, acute uti, dehydration, MS, myasthenia gravis, [place], migraine variant, neuromuscular disorders vertigo, electrolyte imbalance, and metabolic disorders.    PROCEDURES:    Labs were drawn in the emergency department and all labs were reviewed and interpreted by me.  CT scan was performed in the emergency department and the CT scan radiology impression was interpreted by me.    No orders to display       Procedures    MDM         This patient is a 88-year-old female with generalized weakness for the past few days and decreased p.o. intake and nausea and recent ground-level fall injuring her back.    She is complaining of low back pain and has tenderness on exam and I opted to get a CT of the abdomen pelvis to evaluate further and she does actually have a compression fracture of the L3 spine but she is neuro intact distally.    I consulted with Dr. Allen of spine surgery who recommends TLSO brace coming up to the level of the inferior base of the sternum.  She will follow-up with spine surgery clinic.    However, given her advanced age, back pain, current UTI and signs of generalized weakness and decline I think she is still a fall risk and family member was unable to take care of her at home as she is unable to assist with even getting out of bed so I will plan to admit her to the medicine service.                  Patient Care Considerations:          Consultants/Shared Management Plan:    Consultant: I have discussed the case with Dr. Allen of spine surgery who agrees to consult on the patient.  PCP: I have discussed the case with patient's PCP who agrees to accept the patient for admission.    Social Determinants of Health:    Patient has presented with family members who are responsible, reliable and will ensure follow up care.      Disposition and Care Coordination:    Admit:   Through independent evaluation of the patient's history, physical, and imperical data, the patient meets criteria for inpatient admission to the hospital.        Final diagnoses:    Generalized weakness   Acute UTI (urinary tract infection)   Acute right-sided low back pain without sciatica   Fall, initial encounter   Compression fracture of L3 vertebra, initial encounter   Hypokalemia        ED Disposition       ED Disposition   Decision to Admit    Condition   --    Comment   Level of Care: Med/Surg [1]   Diagnosis: Generalized weakness [451330]   Admitting Physician: LANNY BALDERRAMA [[id number]]   Attending Physician: LANNY BALDERRAMA [[id number]]   Isolate for COVID?: No [0]   Certification: I Certify That Inpatient Hospital Services Are Medically Necessary For Greater Than 2 Midnights                 This medical record created using voice recognition software.             Johan Hansen MD  12/18/24 0789

## 2024-12-18 NOTE — PLAN OF CARE
Goal Outcome Evaluation:  Plan of Care Reviewed With: patient        Progress: no change  Outcome Evaluation: Patient remains alert and oriented x4. No complaints of pain throughout the shift. Patient is a standby assist to the BR. No new issues at this time.

## 2024-12-19 LAB
ANION GAP SERPL CALCULATED.3IONS-SCNC: 9.5 MMOL/L (ref 5–15)
BASOPHILS # BLD AUTO: 0.06 10*3/MM3 (ref 0–0.2)
BASOPHILS NFR BLD AUTO: 0.8 % (ref 0–1.5)
BUN SERPL-MCNC: 17 MG/DL (ref 8–23)
BUN/CREAT SERPL: 22.4 (ref 7–25)
CALCIUM SPEC-SCNC: 8.4 MG/DL (ref 8.6–10.5)
CHLORIDE SERPL-SCNC: 103 MMOL/L (ref 98–107)
CO2 SERPL-SCNC: 28.5 MMOL/L (ref 22–29)
CREAT SERPL-MCNC: 0.76 MG/DL (ref 0.57–1)
DEPRECATED RDW RBC AUTO: 49.5 FL (ref 37–54)
EGFRCR SERPLBLD CKD-EPI 2021: 76.4 ML/MIN/1.73
EOSINOPHIL # BLD AUTO: 0.51 10*3/MM3 (ref 0–0.4)
EOSINOPHIL NFR BLD AUTO: 7.2 % (ref 0.3–6.2)
ERYTHROCYTE [DISTWIDTH] IN BLOOD BY AUTOMATED COUNT: 13.6 % (ref 12.3–15.4)
GLUCOSE SERPL-MCNC: 102 MG/DL (ref 65–99)
HCT VFR BLD AUTO: 41.8 % (ref 34–46.6)
HGB BLD-MCNC: 13.5 G/DL (ref 12–15.9)
IMM GRANULOCYTES # BLD AUTO: 0.1 10*3/MM3 (ref 0–0.05)
IMM GRANULOCYTES NFR BLD AUTO: 1.4 % (ref 0–0.5)
LYMPHOCYTES # BLD AUTO: 1.87 10*3/MM3 (ref 0.7–3.1)
LYMPHOCYTES NFR BLD AUTO: 26.4 % (ref 19.6–45.3)
MCH RBC QN AUTO: 32.1 PG (ref 26.6–33)
MCHC RBC AUTO-ENTMCNC: 32.3 G/DL (ref 31.5–35.7)
MCV RBC AUTO: 99.5 FL (ref 79–97)
MONOCYTES # BLD AUTO: 0.69 10*3/MM3 (ref 0.1–0.9)
MONOCYTES NFR BLD AUTO: 9.7 % (ref 5–12)
NEUTROPHILS NFR BLD AUTO: 3.85 10*3/MM3 (ref 1.7–7)
NEUTROPHILS NFR BLD AUTO: 54.5 % (ref 42.7–76)
NRBC BLD AUTO-RTO: 0 /100 WBC (ref 0–0.2)
PLATELET # BLD AUTO: 258 10*3/MM3 (ref 140–450)
PMV BLD AUTO: 9.9 FL (ref 6–12)
POTASSIUM SERPL-SCNC: 3.3 MMOL/L (ref 3.5–5.2)
RBC # BLD AUTO: 4.2 10*6/MM3 (ref 3.77–5.28)
SODIUM SERPL-SCNC: 141 MMOL/L (ref 136–145)
WBC NRBC COR # BLD AUTO: 7.08 10*3/MM3 (ref 3.4–10.8)

## 2024-12-19 PROCEDURE — 99221 1ST HOSP IP/OBS SF/LOW 40: CPT | Performed by: NEUROLOGICAL SURGERY

## 2024-12-19 PROCEDURE — 25010000002 ENOXAPARIN PER 10 MG: Performed by: STUDENT IN AN ORGANIZED HEALTH CARE EDUCATION/TRAINING PROGRAM

## 2024-12-19 PROCEDURE — 80048 BASIC METABOLIC PNL TOTAL CA: CPT | Performed by: STUDENT IN AN ORGANIZED HEALTH CARE EDUCATION/TRAINING PROGRAM

## 2024-12-19 PROCEDURE — 25010000002 CEFTRIAXONE PER 250 MG: Performed by: STUDENT IN AN ORGANIZED HEALTH CARE EDUCATION/TRAINING PROGRAM

## 2024-12-19 PROCEDURE — 85025 COMPLETE CBC W/AUTO DIFF WBC: CPT | Performed by: STUDENT IN AN ORGANIZED HEALTH CARE EDUCATION/TRAINING PROGRAM

## 2024-12-19 RX ADMIN — ENOXAPARIN SODIUM 40 MG: 100 INJECTION SUBCUTANEOUS at 08:43

## 2024-12-19 RX ADMIN — FAMOTIDINE 40 MG: 20 TABLET ORAL at 08:43

## 2024-12-19 RX ADMIN — Medication 10 ML: at 08:43

## 2024-12-19 RX ADMIN — PAROXETINE HYDROCHLORIDE 40 MG: 20 TABLET, FILM COATED ORAL at 08:43

## 2024-12-19 RX ADMIN — SODIUM CHLORIDE 1000 MG: 9 INJECTION INTRAMUSCULAR; INTRAVENOUS; SUBCUTANEOUS at 08:43

## 2024-12-19 RX ADMIN — ATORVASTATIN CALCIUM 10 MG: 10 TABLET, FILM COATED ORAL at 08:43

## 2024-12-19 RX ADMIN — ASPIRIN 325 MG: 325 TABLET ORAL at 08:43

## 2024-12-19 NOTE — PROGRESS NOTES
[facility]     Progress Note    Patient Name: Cristina Pina  : 1935  MRN: [id number]  Primary Care Physician:  Michael Serrano MD  Date of admission: 2024    Subjective   No major acute event overnight    Scheduled Meds:aspirin, 325 mg, Oral, Daily  atorvastatin, 10 mg, Oral, Daily  cefTRIAXone, 1,000 mg, Intravenous, Daily  enoxaparin, 40 mg, Subcutaneous, Daily  famotidine, 40 mg, Oral, Daily  PARoxetine, 40 mg, Oral, QAM      Continuous Infusions:   PRN Meds:.  acetaminophen **OR** acetaminophen **OR** acetaminophen    ALPRAZolam    aluminum-magnesium hydroxide-simethicone    senna-docusate sodium **AND** polyethylene glycol **AND** bisacodyl **AND** bisacodyl    Morphine **AND** naloxone    ondansetron    oxyCODONE-acetaminophen    sodium chloride       Review of Systems  Constitutional:        Weakness tiredness fatigue  Eyes:                       No blurry vision, eye discharge, eye irritation, eye pain  HEENT:                   No acute hair loss, earache and discharge, nasal congestion or discharge, sore throat, postnasal drip  Respiratory:           No shortness of breath coughing sputum production wheezing hemoptysis pleuritic chest pain  Cardiovascular:     No chest pain, orthopnea, PND, dizziness, palpitation, lower extremity edema  Gastrointestinal:   No nausea vomiting diarrhea abdominal pain constipation  Genitourinary:       No urinary incontinence, hesitancy, frequency, urgency, dysuria  Hematologic:         No bruising, bleeding, pallor, lymphadenopathy  Endocrine:            No coldness, hot flashes, polyuria, abnormal hair growth  Musculoskeletal:    No body pains, aches, arthritic pains, muscle pain ,muscle wasting  Psychiatric:          No low or high mood, anxiety, hallucinations, delusions  Skin.                      No rash, ulcers, bruising, itching  Neurological:        No confusion, headache, focal weakness, numbness, dysphasia    Objective   Objective      Vitals:   Temp:  [97.3 °F (36.3 °C)-99 °F (37.2 °C)] 97.3 °F (36.3 °C)  Heart Rate:  [51-88] 88  Resp:  [16-21] 20  BP: ()/(58-92) 137/92  Physical Exam    Constitutional: Awake, alert responsive, conversant, no obvious distress              Psychiatric:  Appropriate affect, cooperative   Neurologic:  Awake alert ,oriented x 3, strength symmetric in all extremities, Cranial Nerves grossly intact to confrontation, speech clear   Eyes:   PERRLA, sclerae anicteric, no conjunctival injection   HEENT:  Moist mucous membranes, no nasal or eye discharge, no throat congestion   Neck:   Supple, no thyromegaly, no lymphadenopathy, trachea midline, no elevated JVD   Respiratory:  Clear to auscultation bilaterally, nonlabored respirations    Cardiovascular: RRR, no murmurs, rubs, or gallops, palpable pedal pulses bilaterally, No bilateral ankle edema   Gastrointestinal: Positive bowel sounds, soft, nontender, nondistended, no organomegaly   Musculoskeletal:  No clubbing or cyanosis to extremities,muscle wasting, joint swelling, muscle weakness             Skin:                      No rashes, bruising, skin ulcers, petechiae or ecchymosis    Result Review    Result Review:  I have personally reviewed the results from the time of this admission to 12/19/2024 08:53 EST and agree with these findings:  []  Laboratory  []  Microbiology  []  Radiology  []  EKG/Telemetry   []  Cardiology/Vascular   []  Pathology  []  Old records  []  Other:    Assessment & Plan   Assessment / Plan       Active Hospital Problems:  Active Hospital Problems    Diagnosis     **Generalized weakness     UTI (urinary tract infection)     Compression fracture of L3 vertebra      88-year-old female with past medical history of overactive bladder, dementia hyperlipidemia, anxiety/depression comes into the hospital due to generalized weakness that has been going on for the last 3 to 4 days complicated with a fall and acute L3-L4 compression fracture  and UA concerning for UTI and inability to walk with family having a hard time caring to the patient     Plan:   Neurosurgery was consulted.  Conservative management with use of brace.  Appreciate the help in managing the patient  UA concerning for UTI and will continue with antibiotics for 5 days.  Cultures are pending  PT OT   to help with placement.    Electronically signed by Teri Paez MD, 12/19/24, 8:53 AM EST.

## 2024-12-19 NOTE — PLAN OF CARE
Goal Outcome Evaluation:              Outcome Evaluation: Patient remains alert and orientated x4, no complaints of pain. Patient has to be reminded to wear brace with up. No new issue at this time

## 2024-12-19 NOTE — PAYOR COMM NOTE
"Sanaz Pina (88 y.o. Female)       Date of Birth   1935    Social Security Number       Address   [address] [place] [address]    Home Phone   [phone]    MRN   [id number]       Jainism   None    Marital Status                               Admission Date   24    Admission Type   Emergency    Admitting Provider   Teri Paez MD    Attending Provider   Teri Paez MD    Department, Room/Bed   [address], [address]/       Discharge Date       Discharge Disposition       Discharge Destination                                 Attending Provider: Teri Paez MD    Allergies: Ciprofibrate, Sulfa Antibiotics    Isolation: None   Infection: None   Code Status: CPR    Ht: 170.2 cm (67\")   Wt: 89.5 kg (197 lb 5 oz)    Admission Cmt: None   Principal Problem: Generalized weakness [R53.1]                   Active Insurance as of 2024       Primary Coverage       Payor Plan Insurance Group Employer/Plan Group    ANTHEM MEDICARE REPLACEMENT ANTHEM MED ADV HMO [id number]       Payor Plan Address Payor Plan Phone Number Payor Plan Fax Number Effective Dates    [address] [phone]  2024 - None Entered    [facility] [id number]         Subscriber Name Subscriber Birth Date Member ID       SANAZ PINA 1935 [id number]                     Emergency Contacts        (Rel.) Home Phone Work Phone Mobile Phone    valente gonzalez (Son) -- -- [phone]                 History & Physical        Teri Paez MD at 24 [address] [place] [address]   HISTORY AND PHYSICAL    Patient Name: Sanaz Pina  : 1935  MRN: [id number]  Primary Care Physician:  Michael Serrano MD  Date of admission: 2024    Subjective  Subjective     Chief Complaint: Generalized weakness    HPI:    Sanaz Pina is a 88 y.o. female 88-year-old female with past medical history of overactive bladder, dementia " hyperlipidemia, anxiety/depression comes into the hospital due to generalized weakness that has been going on for the last 3 to 4 days.  This is also been associated with an unwitnessed fall and since then has had difficulty ambulating.  She also has have been having increased nausea and vomiting.  Patient was treated about a week ago for pneumonia with antibiotics.  Per patient's family they have been unable to take care of the patient due to the fall and difficulty ambulating.  They have also noticed that she is progressively weaker than her normal state.  Due to those reason patient presented the ER.    Patient's blood pressures and vitals were stable.  Her chemistries are normal except for a potassium of 3.1.  Her CBC is normal.  Her lactate was mild elevated at 2.3.  Her UA is consistent with UTI.  Patient was given IV fluids with improvement in lactate.  She was given antibiotics.  CT abdomen pelvis shows compression fracture of L3 which is acute.  Noted to also have large hiatal hernia.  Patient mated for further management of symptoms     Review of Systems  All review of systems negative except as in subjective complaints:  Personal History     Past Medical History:   Diagnosis Date    Depression     Hypertension        Past Surgical History:   Procedure Laterality Date    CARDIAC SURGERY      CHOLECYSTECTOMY      EYE SURGERY      HYSTERECTOMY      KNEE CLOSED REDUCTION      TOTAL KNEE ARTHROPLASTY         Family History: family history is not on file. Otherwise pertinent FHx was reviewed and not pertinent to current issue.    Social History:  reports that she has never smoked. She has never used smokeless tobacco. She reports that she does not currently use alcohol. She reports that she does not use drugs.    Home Medications:  PARoxetine, PreserVision AREDS, albuterol sulfate HFA, aspirin, multivitamin with minerals, oxybutynin, potassium chloride, and simvastatin      Allergies:  Allergies   Allergen  Reactions    Ciprofibrate Hives    Sulfa Antibiotics Hives       Objective  Objective     Vitals:   Temp:  [97.7 °F (36.5 °C)-99.1 °F (37.3 °C)] 98.1 °F (36.7 °C)  Heart Rate:  [57-70] 70  Resp:  [18-21] 18  BP: (116-147)/(63-94) 116/69  Physical Exam               Constitutional:         Awake, alert responsive, conversant, no obvious distress   Eyes:                       PERRLA, sclerae anicteric, no conjunctival injection   HEENT:                   Moist mucous membranes, no nasal or eye discharge, no throat congestion   Neck:                      Supple, no thyromegaly, no lymphadenopathy, trachea midline, no elevated JVD   Respiratory:           Clear to auscultation bilaterally, nonlabored respirations    Cardiovascular:     RRR, no murmurs, rubs, or gallops, palpable pedal pulses bilaterally,No bilateral ankle edema   Gastrointestinal:   Positive bowel sounds, soft, nontender, nondistended, no organomegaly   Musculoskeletal:   No clubbing or cyanosis to extremities, muscle wasting, joint swelling, muscle weakness   Psychiatric:             Appropriate affect, cooperative   Neurologic:            Awake alert ,oriented x 3, strength symmetric in all extremities, Cranial Nerves grossly intact to confrontation, speech clear   Skin:                      No rashes, bruising, skin ulcers, petechiae or ecchymosis    Result Review   Result Review:  I have personally reviewed the results from the time of this admission to 12/18/2024 19:18 EST and agree with these findings:  []  Laboratory  []  Microbiology  []  Radiology  []  EKG/Telemetry   []  Cardiology/Vascular   []  Pathology  []  Old records  []  Other:    Results from last 7 days   Lab Units 12/18/24  0824   WBC 10*3/mm3 9.09   HEMOGLOBIN g/dL 15.4   PLATELETS 10*3/mm3 309     Results from last 7 days   Lab Units 12/18/24  0824   SODIUM mmol/L 140   POTASSIUM mmol/L 3.1*   CHLORIDE mmol/L 98   CO2 mmol/L 26.9   ANION GAP mmol/L 15.1*   BUN mg/dL 16   CREATININE  mg/dL 0.82   GLUCOSE mg/dL 138*         Assessment & Plan  Assessment / Plan     Active Hospital Problems:  Active Hospital Problems    Diagnosis     **Generalized weakness     UTI (urinary tract infection)     Compression fracture of L3 vertebra      88-year-old female with past medical history of overactive bladder, dementia hyperlipidemia, anxiety/depression comes into the hospital due to generalized weakness that has been going on for the last 3 to 4 days complicated with a fall and acute L3-L4 compression fracture and UA concerning for UTI and inability to walk with family having a hard time caring to the patient    Plan:   Neurosurgery was consulted.  Conservative management with use of brace.  Appreciate the help in managing the patient  UA concerning for UTI and will continue with antibiotics for 5 days.  Cultures are pending  PT OT   to help with placement.    VTE Prophylaxis:  Pharmacologic VTE prophylaxis orders are present.        CODE STATUS:    Code Status (Patient has no pulse and is not breathing): CPR (Attempt to Resuscitate)  Medical Interventions (Patient has pulse or is breathing): Full Support    Admission Status:  I believe this patient meets inpatient status.    Electronically signed by Teri Paez MD, 12/18/24, 1:09 PM EST.             Electronically signed by Teri Paez MD at 12/18/24 1918          Emergency Department Notes        Neda Mccarty, MARIELLA at 12/18/24 1157          ED to Inpatient Report    PATIENT DEMOGRAPHICS  Cristina NOVA Yovani   1935  88 y.o.  female  Allergies   Allergen Reactions    Ciprofibrate Hives    Sulfa Antibiotics Hives          12/18/24  0806   Weight: 89.4 kg (197 lb 1.5 oz)      There are no questions and answers to display.        HISTORY  Past Medical History:   Diagnosis Date    Depression     Hypertension       Past Surgical History:   Procedure Laterality Date    CARDIAC SURGERY      CHOLECYSTECTOMY      EYE SURGERY       HYSTERECTOMY      KNEE CLOSED REDUCTION      TOTAL KNEE ARTHROPLASTY       Prior to Admission medications    Medication Sig Start Date End Date Taking? Authorizing Provider   albuterol sulfate  (90 Base) MCG/ACT inhaler Inhale 2 puffs Every 4 (Four) Hours As Needed for Wheezing.   Yes Emergency, Nurse Epic, RN   aspirin 325 MG tablet Take 1 tablet by mouth Daily.   Yes Emergency, Nurse Epic, RN   Multiple Vitamins-Minerals (PreserVision AREDS) capsule Take 1 capsule by mouth Daily.   Yes ProviderAnjali MD   multivitamin with minerals tablet tablet Take 1 tablet by mouth Daily.   Yes Emergency, Nurse Aleida RN   oxybutynin (DITROPAN) 5 MG tablet Take 1 tablet by mouth Every 12 (Twelve) Hours. 11/7/24  Yes ProviderAnjali MD   Paxil 40 MG tablet Take 1 tablet by mouth Every Morning.   Yes Provider, MD Anjali   potassium chloride (KLOR-CON M20) 20 MEQ CR tablet Take 1 tablet by mouth Daily.   Yes ProviderAnjali MD   simvastatin (ZOCOR) 20 MG tablet Take 1 tablet by mouth Daily.   Yes Emergency, Nurse Aleida RN   levothyroxine (SYNTHROID, LEVOTHROID) 125 MCG tablet Take 1 tablet by mouth Daily.  12/18/24 Yes Jose E, Nurse Epic, RN   losartan (COZAAR) 50 MG tablet Take 1 tablet by mouth 2 (Two) Times a Day for 30 days. 8/14/22 12/18/24  Felice Guillermo MD   nitroglycerin (NITROSTAT) 0.4 MG SL tablet Place 1 tablet under the tongue Every 5 (Five) Minutes As Needed for Chest Pain (Only if SBP Greater Than 100). Take no more than 3 doses in 15 minutes. 8/14/22 12/18/24  Felice Guillermo MD   Omega-3 1000 MG capsule Take 1 capsule by mouth Daily.  12/18/24  Jose E, Nurse Aleida RN   oxybutynin XL (DITROPAN-XL) 5 MG 24 hr tablet Take 1 tablet by mouth Daily.  12/18/24  Jose E, Nurse Aleida RN   PARoxetine (PAXIL) 20 MG tablet Take 1 tablet by mouth Every Morning.  12/18/24  Jose E, Nurse Aleida RN   potassium chloride (MICRO-K) 10 MEQ CR capsule Take 2 capsules by mouth 2 (Two) Times a  "Day With Meals. 8/14/22 12/18/24  Felice Guillermo MD        [facility]  Chief Complaint   Patient presents with    Weakness - Generalized    Nausea    Vomiting    Back Pain      Diagnosis Plan   1. Generalized weakness        2. Acute UTI (urinary tract infection)        3. Acute right-sided low back pain without sciatica        4. Fall, initial encounter        5. Compression fracture of L3 vertebra, initial encounter        6. Hypokalemia           Teri Paez MD  Vitals:    12/18/24 0750 12/18/24 0806 12/18/24 1100 12/18/24 1145   BP: 137/94  144/89 118/63   BP Location: Left arm  Left arm Left arm   Patient Position: Lying  Lying Lying   Pulse: 69  58 58   Resp: 20  21 20   Temp:  99.1 °F (37.3 °C) 99 °F (37.2 °C) 98.9 °F (37.2 °C)   TempSrc:  Rectal Rectal Oral   SpO2: 91%  94% 90%   Weight:  89.4 kg (197 lb 1.5 oz)     Height:  170.2 cm (67\")       Results for orders placed or performed during the hospital encounter of 12/18/24   Urinalysis With Microscopic If Indicated (No Culture) - Urine, Clean Catch    Collection Time: 12/18/24  8:13 AM    Specimen: Urine, Clean Catch   Result Value Ref Range    Color, UA Yellow Yellow, Straw    Appearance, UA Cloudy (A) Clear    pH, UA 6.0 5.0 - 8.0    Specific Gravity, UA 1.017 1.005 - 1.030    Glucose, UA Negative Negative    Ketones, UA Trace (A) Negative    Bilirubin, UA Negative Negative    Blood, UA Negative Negative    Protein, UA Negative Negative    Leuk Esterase, UA Moderate (2+) (A) Negative    Nitrite, UA Positive (A) Negative    Urobilinogen, UA 1.0 E.U./dL 0.2 - 1.0 E.U./dL   Urinalysis, Microscopic Only - Urine, Clean Catch    Collection Time: 12/18/24  8:13 AM    Specimen: Urine, Clean Catch   Result Value Ref Range    RBC, UA None Seen None Seen, 0-2 /HPF    WBC, UA 11-20 (A) None Seen, 0-2 /HPF    Bacteria, UA 3+ (A) None Seen /HPF    Squamous Epithelial Cells, UA None Seen None Seen, 0-2 /HPF    Hyaline Casts, UA None Seen None Seen /LPF    " Methodology Manual Light Microscopy    Comprehensive Metabolic Panel    Collection Time: 12/18/24  8:24 AM    Specimen: Arm, Left; Blood   Result Value Ref Range    Glucose 138 (H) 65 - 99 mg/dL    BUN 16 8 - 23 mg/dL    Creatinine 0.82 0.57 - 1.00 mg/dL    Sodium 140 136 - 145 mmol/L    Potassium 3.1 (L) 3.5 - 5.2 mmol/L    Chloride 98 98 - 107 mmol/L    CO2 26.9 22.0 - 29.0 mmol/L    Calcium 9.0 8.6 - 10.5 mg/dL    Total Protein 6.5 6.0 - 8.5 g/dL    Albumin 3.6 3.5 - 5.2 g/dL    ALT (SGPT) 29 1 - 33 U/L    AST (SGOT) 37 (H) 1 - 32 U/L    Alkaline Phosphatase 83 39 - 117 U/L    Total Bilirubin 0.7 0.0 - 1.2 mg/dL    Globulin 2.9 gm/dL    A/G Ratio 1.2 g/dL    BUN/Creatinine Ratio 19.5 7.0 - 25.0    Anion Gap 15.1 (H) 5.0 - 15.0 mmol/L    eGFR 69.8 >60.0 mL/min/1.73   Lipase    Collection Time: 12/18/24  8:24 AM    Specimen: Arm, Left; Blood   Result Value Ref Range    Lipase 25 13 - 60 U/L   Lactic Acid, Plasma    Collection Time: 12/18/24  8:24 AM    Specimen: Arm, Right; Blood   Result Value Ref Range    Lactate 2.3 (C) 0.5 - 2.0 mmol/L   CBC Auto Differential    Collection Time: 12/18/24  8:24 AM    Specimen: Arm, Left; Blood   Result Value Ref Range    WBC 9.09 3.40 - 10.80 10*3/mm3    RBC 4.76 3.77 - 5.28 10*6/mm3    Hemoglobin 15.4 12.0 - 15.9 g/dL    Hematocrit 46.2 34.0 - 46.6 %    MCV 97.1 (H) 79.0 - 97.0 fL    MCH 32.4 26.6 - 33.0 pg    MCHC 33.3 31.5 - 35.7 g/dL    RDW 13.2 12.3 - 15.4 %    RDW-SD 47.0 37.0 - 54.0 fl    MPV 9.9 6.0 - 12.0 fL    Platelets 309 140 - 450 10*3/mm3    Neutrophil % 63.9 42.7 - 76.0 %    Lymphocyte % 22.4 19.6 - 45.3 %    Monocyte % 7.6 5.0 - 12.0 %    Eosinophil % 4.2 0.3 - 6.2 %    Basophil % 0.8 0.0 - 1.5 %    Immature Grans % 1.1 (H) 0.0 - 0.5 %    Neutrophils, Absolute 5.81 1.70 - 7.00 10*3/mm3    Lymphocytes, Absolute 2.04 0.70 - 3.10 10*3/mm3    Monocytes, Absolute 0.69 0.10 - 0.90 10*3/mm3    Eosinophils, Absolute 0.38 0.00 - 0.40 10*3/mm3    Basophils, Absolute 0.07  0.00 - 0.20 10*3/mm3    Immature Grans, Absolute 0.10 (H) 0.00 - 0.05 10*3/mm3    nRBC 0.0 0.0 - 0.2 /100 WBC   Green Top (Gel)    Collection Time: 12/18/24  8:24 AM   Result Value Ref Range    Extra Tube Hold for add-ons.    Lavender Top    Collection Time: 12/18/24  8:24 AM   Result Value Ref Range    Extra Tube hold for add-on    Gold Top - SST    Collection Time: 12/18/24  8:24 AM   Result Value Ref Range    Extra Tube Hold for add-ons.    Light Blue Top    Collection Time: 12/18/24  8:24 AM   Result Value Ref Range    Extra Tube Hold for add-ons.    COVID-19, FLU A/B, RSV PCR 1 HR TAT - Swab, Nasopharynx    Collection Time: 12/18/24  8:30 AM    Specimen: Nasopharynx; Swab   Result Value Ref Range    COVID19 Not Detected Not Detected - Ref. Range    Influenza A PCR Not Detected Not Detected    Influenza B PCR Not Detected Not Detected    RSV, PCR Not Detected Not Detected      CT Abdomen Pelvis With Contrast   Final Result   Impression:   1.There is a moderate biconcave compression fracture of L3 which appears acute. Please correlate clinically for pain/tenderness at this site.   2.Urinary bladder is decompressed which limits its evaluation. There is mild air within the bladder lumen which may be related to recent instrumentation. Please correlate with urinalysis to exclude urinary tract infection.   3.Large hiatal hernia is partially visualized and contains most of the stomach and a portion of transverse colon.   4.Colonic diverticulosis.   5.Suspect hepatic steatosis.   6.Additional findings as detailed above.            Electronically Signed: Harmeet Lund MD     12/18/2024 10:18 AM EST     Workstation ID: [id number]        Lines, Drains & Airways       Active LDAs       Name Placement date Placement time Site Days    Peripheral IV 12/18/24 0824 Left Antecubital 12/18/24  0824  Antecubital  less than 1                  Medications   sodium chloride 0.9 % flush 10 mL (has no administration in time range)    sodium chloride 0.9 % bolus 500 mL (0 mL Intravenous Stopped 12/18/24 1040)   ondansetron (ZOFRAN) injection 4 mg (4 mg Intravenous Given 12/18/24 1001)   acetaminophen (OFIRMEV) injection 1,000 mg (1,000 mg Intravenous Given 12/18/24 1010)   cefTRIAXone (ROCEPHIN) in NS 1 gram/10ml IV PUSH syringe (1,000 mg Intravenous Given 12/18/24 1002)   iopamidol (ISOVUE-370) 76 % injection 100 mL (100 mL Intravenous Given 12/18/24 0958)   potassium chloride (MICRO-K/KLOR-CON) CR capsule (40 mEq Oral Given 12/18/24 1014)      No orders to display        Neda Mccarty RN  12/18/24 11:58 EST     Electronically signed by Neda Mccarty RN at 12/18/24 1158       Johan Hansen MD at 12/18/24 1132          Time: 11:32 AM EST  Date of encounter:  12/18/2024  Independent Historian/Clinical History and Information was obtained by:   Patient and Family    History is limited by: Dementia    Chief Complaint: Fall weakness nausea      History of Present Illness:  Patient is a 88 y.o. year old female with history of hypertension who presents to the emergency department for evaluation of generalized weakness for the past few days, nausea and decreased p.o. intake, just had a fall the other day at home and has had moderately severe lower back pain ever since.    Patient's family member at the bedside who is her primary caregiver reports that she has been weaker than usual, and now with so much back pain she has not been able to get up and assist him with getting her out of bed and essentially is unable to take care of her now.    No fever reported but some generalized malaise noted.  No cough or congestion.      Patient Care Team  Primary Care Provider: Michael Serrano MD    Past Medical History:     Allergies   Allergen Reactions    Ciprofibrate Hives    Sulfa Antibiotics Hives     Past Medical History:   Diagnosis Date    Depression     Hypertension      Past Surgical History:   Procedure Laterality Date    CARDIAC SURGERY       CHOLECYSTECTOMY      EYE SURGERY      HYSTERECTOMY      KNEE CLOSED REDUCTION      TOTAL KNEE ARTHROPLASTY       History reviewed. No pertinent family history.    Home Medications:  Prior to Admission medications    Medication Sig Start Date End Date Taking? Authorizing Provider   albuterol sulfate  (90 Base) MCG/ACT inhaler Inhale 2 puffs Every 4 (Four) Hours As Needed for Wheezing.   Yes Emergency, Nurse Epic, RN   aspirin 325 MG tablet Take 1 tablet by mouth Daily.   Yes Jose E, Nurse Epic, RN   Multiple Vitamins-Minerals (PreserVision AREDS) capsule Take 1 capsule by mouth Daily.   Yes ProviderAnjali MD   multivitamin with minerals tablet tablet Take 1 tablet by mouth Daily.   Yes Emergency, Nurse Aleida RN   oxybutynin (DITROPAN) 5 MG tablet Take 1 tablet by mouth Every 12 (Twelve) Hours. 11/7/24  Yes Anjali Cantu MD   Paxil 40 MG tablet Take 1 tablet by mouth Every Morning.   Yes ProviderAnjali MD   potassium chloride (KLOR-CON M20) 20 MEQ CR tablet Take 1 tablet by mouth Daily.   Yes ProviderAnjali MD   simvastatin (ZOCOR) 20 MG tablet Take 1 tablet by mouth Daily.   Yes Emergency, Nurse Aleida RN   levothyroxine (SYNTHROID, LEVOTHROID) 125 MCG tablet Take 1 tablet by mouth Daily.  12/18/24 Yes Nurse Aleida Dorantes RN   losartan (COZAAR) 50 MG tablet Take 1 tablet by mouth 2 (Two) Times a Day for 30 days. 8/14/22 12/18/24  Felice Guillermo MD   nitroglycerin (NITROSTAT) 0.4 MG SL tablet Place 1 tablet under the tongue Every 5 (Five) Minutes As Needed for Chest Pain (Only if SBP Greater Than 100). Take no more than 3 doses in 15 minutes. 8/14/22 12/18/24  Felice Guillermo MD   Omega-3 1000 MG capsule Take 1 capsule by mouth Daily.  12/18/24  Nurse Aleida Dorantes RN   oxybutynin XL (DITROPAN-XL) 5 MG 24 hr tablet Take 1 tablet by mouth Daily.  12/18/24  Nurse Aleida Dorantes RN   PARoxetine (PAXIL) 20 MG tablet Take 1 tablet by mouth Every Morning.  12/18/24   "Emergency, Nurse Aleida, RN   potassium chloride (MICRO-K) 10 MEQ CR capsule Take 2 capsules by mouth 2 (Two) Times a Day With Meals. 8/14/22 12/18/24  Felice Guillermo MD        Social History:   Social History     Tobacco Use    Smoking status: Never    Smokeless tobacco: Never   Vaping Use    Vaping status: Never Used   Substance Use Topics    Alcohol use: Not Currently    Drug use: Never         Review of Systems:  Review of Systems   I performed a 10 point review of systems which was all negative, except for the positives found in the HPI above.  Physical Exam:  /94 (BP Location: Left arm, Patient Position: Lying)   Pulse 69   Temp 99.1 °F (37.3 °C) (Rectal)   Resp 20   Ht 170.2 cm (67\")   Wt 89.4 kg (197 lb 1.5 oz)   SpO2 91%   BMI 30.87 kg/m²     Physical Exam   General: Awake alert and in no obvious distress    HEENT: Head normocephalic atraumatic, eyes PERRLA EOMI, nose normal, oropharynx normal.    Neck: Supple full range of motion, no meningismus, no lymphadenopathy    Heart: Regular rate and rhythm, no murmurs or rubs, 2+ radial pulses bilaterally    Lungs: Clear to auscultation bilaterally without wheezes or crackles, no respiratory distress    Abdomen: Soft, mild suprapubic tenderness, nondistended, no rebound or guarding    Back exam: She has reproducible tenderness over the lumbar spine and over the right lumbar paraspinal muscles but no visual abnormalities    Skin: Warm, dry, no rash    Musculoskeletal: Normal range of motion, no lower extremity edema    Neurologic: Oriented x3, no motor deficits no sensory deficits    Psychiatric: Mood appears stable, no psychosis            Medical Decision Making:      Comorbidities that affect care:    Advanced age, mild dementia    External Notes reviewed:    None      The following orders were placed and all results were independently analyzed by me:  Orders Placed This Encounter   Procedures    Blood Culture - Blood,    Blood Culture - Blood,    " COVID PRE-OP / PRE-PROCEDURE SCREENING ORDER (NO ISOLATION) - Swab, Nasopharynx    COVID-19, FLU A/B, RSV PCR 1 HR TAT - Swab, Nasopharynx    Urine Culture - Urine,    CT Abdomen Pelvis With Contrast    [place] Draw    Comprehensive Metabolic Panel    Lipase    Urinalysis With Microscopic If Indicated (No Culture) - Urine, Clean Catch    Lactic Acid, Plasma    CBC Auto Differential    Urinalysis, Microscopic Only - Urine, Clean Catch    STAT Lactic Acid, Reflex    NPO Diet NPO Type: Strict NPO    Undress & Gown    Brace Application    Obtain & Apply The Following- Back/Torso; Back brace (hard)    IP General Consult (Use specialty-specific consult if known)    Nephrology  (on-call MD unless specified)    Insert Peripheral IV    Inpatient Admission    CBC & Differential    Green Top (Gel)    Lavender Top    Gold Top - SST    Light Blue Top       Medications Given in the Emergency Department:  Medications   sodium chloride 0.9 % flush 10 mL (has no administration in time range)   sodium chloride 0.9 % bolus 500 mL (0 mL Intravenous Stopped 12/18/24 1040)   ondansetron (ZOFRAN) injection 4 mg (4 mg Intravenous Given 12/18/24 1001)   acetaminophen (OFIRMEV) injection 1,000 mg (1,000 mg Intravenous Given 12/18/24 1010)   cefTRIAXone (ROCEPHIN) in NS 1 gram/10ml IV PUSH syringe (1,000 mg Intravenous Given 12/18/24 1002)   iopamidol (ISOVUE-370) 76 % injection 100 mL (100 mL Intravenous Given 12/18/24 0958)   potassium chloride (MICRO-K/KLOR-CON) CR capsule (40 mEq Oral Given 12/18/24 1014)        ED Course:         Labs:    Lab Results (last 24 hours)       Procedure Component Value Units Date/Time    Urinalysis With Microscopic If Indicated (No Culture) - Urine, Clean Catch [[id number]]  (Abnormal) Collected: 12/18/24 0813    Specimen: Urine, Clean Catch Updated: 12/18/24 0851     Color, UA Yellow     Appearance, UA Cloudy     pH, UA 6.0     Specific Gravity, UA 1.017     Glucose, UA Negative     Ketones, UA Trace      Bilirubin, UA Negative     Blood, UA Negative     Protein, UA Negative     Leuk Esterase, UA Moderate (2+)     Nitrite, UA Positive     Urobilinogen, UA 1.0 E.U./dL    Urinalysis, Microscopic Only - Urine, Clean Catch [[id number]]  (Abnormal) Collected: 12/18/24 0813    Specimen: Urine, Clean Catch Updated: 12/18/24 0908     RBC, UA None Seen /HPF      WBC, UA 11-20 /HPF      Bacteria, UA 3+ /HPF      Squamous Epithelial Cells, UA None Seen /HPF      Hyaline Casts, UA None Seen /LPF      Methodology Manual Light Microscopy    Urine Culture - Urine, Urine, Clean Catch [[id number]] Collected: 12/18/24 0813    Specimen: Urine, Clean Catch Updated: 12/18/24 0942    CBC & Differential [[id number]]  (Abnormal) Collected: 12/18/24 0824    Specimen: Blood from Arm, Left Updated: 12/18/24 0844    Narrative:      The following orders were created for panel order CBC & Differential.  Procedure                               Abnormality         Status                     ---------                               -----------         ------                     CBC Auto Differential[[id number]]        Abnormal            Final result                 Please view results for these tests on the individual orders.    Comprehensive Metabolic Panel [[id number]]  (Abnormal) Collected: 12/18/24 0824    Specimen: Blood from Arm, Left Updated: 12/18/24 0903     Glucose 138 mg/dL      BUN 16 mg/dL      Creatinine 0.82 mg/dL      Sodium 140 mmol/L      Potassium 3.1 mmol/L      Chloride 98 mmol/L      CO2 26.9 mmol/L      Calcium 9.0 mg/dL      Total Protein 6.5 g/dL      Albumin 3.6 g/dL      ALT (SGPT) 29 U/L      AST (SGOT) 37 U/L      Alkaline Phosphatase 83 U/L      Total Bilirubin 0.7 mg/dL      Globulin 2.9 gm/dL      A/G Ratio 1.2 g/dL      BUN/Creatinine Ratio 19.5     Anion Gap 15.1 mmol/L      eGFR 69.8 mL/min/1.73     Narrative:      GFR Categories in Chronic Kidney Disease (CKD)      GFR Category          GFR (mL/min/1.73)     Interpretation  G1                     90 or greater         Normal or high (1)  G2                      60-89                Mild decrease (1)  G3a                   45-59                Mild to moderate decrease  G3b                   30-44                Moderate to severe decrease  G4                    15-29                Severe decrease  G5                    14 or less           Kidney failure          (1)In the absence of evidence of kidney disease, neither GFR category G1 or G2 fulfill the criteria for CKD.    eGFR calculation 2021 CKD-EPI creatinine equation, which does not include race as a factor    Lipase [[id number]]  (Normal) Collected: 12/18/24 0824    Specimen: Blood from Arm, Left Updated: 12/18/24 0903     Lipase 25 U/L     Lactic Acid, Plasma [[id number]]  (Abnormal) Collected: 12/18/24 0824    Specimen: Blood from Arm, Right Updated: 12/18/24 0946     Lactate 2.3 mmol/L     Blood Culture - Blood, Arm, Left [[id number]] Collected: 12/18/24 0824    Specimen: Blood from Arm, Left Updated: 12/18/24 0840    Blood Culture - Blood, Arm, Right [[id number]] Collected: 12/18/24 0824    Specimen: Blood from Arm, Right Updated: 12/18/24 0840    CBC Auto Differential [[id number]]  (Abnormal) Collected: 12/18/24 0824    Specimen: Blood from Arm, Left Updated: 12/18/24 0844     WBC 9.09 10*3/mm3      RBC 4.76 10*6/mm3      Hemoglobin 15.4 g/dL      Hematocrit 46.2 %      MCV 97.1 fL      MCH 32.4 pg      MCHC 33.3 g/dL      RDW 13.2 %      RDW-SD 47.0 fl      MPV 9.9 fL      Platelets 309 10*3/mm3      Neutrophil % 63.9 %      Lymphocyte % 22.4 %      Monocyte % 7.6 %      Eosinophil % 4.2 %      Basophil % 0.8 %      Immature Grans % 1.1 %      Neutrophils, Absolute 5.81 10*3/mm3      Lymphocytes, Absolute 2.04 10*3/mm3      Monocytes, Absolute 0.69 10*3/mm3      Eosinophils, Absolute 0.38 10*3/mm3      Basophils, Absolute 0.07 10*3/mm3      Immature Grans, Absolute 0.10 10*3/mm3      nRBC 0.0 /100 WBC     COVID  PRE-OP / PRE-PROCEDURE SCREENING ORDER (NO ISOLATION) - Swab, Nasopharynx [[id number]]  (Normal) Collected: 12/18/24 0830    Specimen: Swab from Nasopharynx Updated: 12/18/24 0922    Narrative:      The following orders were created for panel order COVID PRE-OP / PRE-PROCEDURE SCREENING ORDER (NO ISOLATION) - Swab, Nasopharynx.  Procedure                               Abnormality         Status                     ---------                               -----------         ------                     COVID-19, FLU A/B, RSV P...[[id number]]  Normal              Final result                 Please view results for these tests on the individual orders.    COVID-19, FLU A/B, RSV PCR 1 HR TAT - Swab, Nasopharynx [[id number]]  (Normal) Collected: 12/18/24 0830    Specimen: Swab from Nasopharynx Updated: 12/18/24 0922     COVID19 Not Detected     Influenza A PCR Not Detected     Influenza B PCR Not Detected     RSV, PCR Not Detected    Narrative:      Fact sheet for providers: https://www.fda.gov/media/989734/download    Fact sheet for patients: https://www.fda.gov/media/326128/download    Test performed by PCR.    STAT Lactic Acid, Reflex [[id number]] Collected: 12/18/24 1148    Specimen: Blood from Arm, Right Updated: 12/18/24 1152             Imaging:    CT Abdomen Pelvis With Contrast    Result Date: 12/18/2024  CT ABDOMEN PELVIS W CONTRAST Date of Exam: 12/18/2024 9:42 AM EST Indication: eval R flank pain, UTI; also fell recently now with low back pain. Comparison: 8/11/2022 Technique: Axial CT images were obtained of the abdomen and pelvis after the uneventful intravenous administration of iodinated contrast. Reconstructed coronal and sagittal images were also obtained. Automated exposure control and iterative construction methods were used. Findings: Liver: The liver is prominent in size and slightly decreased in attenuation. Left hepatic lobe cyst is seen. No biliary dilation is seen. Gallbladder: Surgically absent.  Pancreas: Unremarkable. Spleen: Unremarkable. Adrenal glands: Unremarkable. Genitourinary tract: There are several small right renal cysts. Kidneys are otherwise unremarkable. No hydronephrosis is seen. The visualized portions of the ureters are unremarkable. Urinary bladder is decompressed which limits its evaluation. There is mild air within the bladder lumen which may be related to recent instrumentation. Status post hysterectomy. Gastrointestinal tract: Large hiatal hernia containing most of the stomach and portion of transverse colon. Colonic diverticula are seen. No findings to suggest bowel obstruction. Appendix: No findings to suggest acute appendicitis. Other findings: No free air or free fluid. No pathologically enlarged lymph nodes are seen. Vascular calcifications are present. The IVC is unremarkable. Bones and soft tissues: There is a moderate biconcave compression fracture of L3 which appears acute. Bones are demineralized. There are degenerative changes within the spine. There are degenerative changes within both hips, right greater than left. Superficial soft tissues demonstrate no acute abnormality. Fat containing Spigelian hernia on the left. Lung bases: The visualized lung bases are clear. Large hiatal hernia is partially visualized and contains most of the stomach and a portion of transverse colon.     Impression: 1.There is a moderate biconcave compression fracture of L3 which appears acute. Please correlate clinically for pain/tenderness at this site. 2.Urinary bladder is decompressed which limits its evaluation. There is mild air within the bladder lumen which may be related to recent instrumentation. Please correlate with urinalysis to exclude urinary tract infection. 3.Large hiatal hernia is partially visualized and contains most of the stomach and a portion of transverse colon. 4.Colonic diverticulosis. 5.Suspect hepatic steatosis. 6.Additional findings as detailed above. Electronically  Signed: Harmeet Lund MD  12/18/2024 10:18 AM EST  Workstation ID: [id number]       Differential Diagnosis and Discussion:    Back Pain: The patient presents with back pain. My differential diagnosis includes but is not limited to acute spinal epidural abscess, acute spinal epidural bleed, cauda equina syndrome, abdominal aortic aneurysm, aortic dissection, kidney stone, pyelonephritis, musculoskeletal back pain, spinal fracture, and osteoarthritis.   Weakness: Based on the patient's history, signs, and symptoms, the diffential diagnosis includes but is not limited to meningitis, stroke, sepsis, subarachnoid hemorrhage, intracranial bleeding, encephalitis, acute uti, dehydration, MS, myasthenia gravis, Guillan [place], migraine variant, neuromuscular disorders vertigo, electrolyte imbalance, and metabolic disorders.    PROCEDURES:    Labs were drawn in the emergency department and all labs were reviewed and interpreted by me.  CT scan was performed in the emergency department and the CT scan radiology impression was interpreted by me.    No orders to display       Procedures    MDM         This patient is a 88-year-old female with generalized weakness for the past few days and decreased p.o. intake and nausea and recent ground-level fall injuring her back.    She is complaining of low back pain and has tenderness on exam and I opted to get a CT of the abdomen pelvis to evaluate further and she does actually have a compression fracture of the L3 spine but she is neuro intact distally.    I consulted with Dr. Allen of spine surgery who recommends TLSO brace coming up to the level of the inferior base of the sternum.  She will follow-up with spine surgery clinic.    However, given her advanced age, back pain, current UTI and signs of generalized weakness and decline I think she is still a fall risk and family member was unable to take care of her at home as she is unable to assist with even getting out of bed so I will  plan to admit her to the medicine service.                  Patient Care Considerations:          Consultants/Shared Management Plan:    Consultant: I have discussed the case with Dr. Allen of spine surgery who agrees to consult on the patient.  PCP: I have discussed the case with patient's PCP who agrees to accept the patient for admission.    Social Determinants of Health:    Patient has presented with family members who are responsible, reliable and will ensure follow up care.      Disposition and Care Coordination:    Admit:   Through independent evaluation of the patient's history, physical, and imperical data, the patient meets criteria for inpatient admission to the hospital.        Final diagnoses:   Generalized weakness   Acute UTI (urinary tract infection)   Acute right-sided low back pain without sciatica   Fall, initial encounter   Compression fracture of L3 vertebra, initial encounter   Hypokalemia        ED Disposition       ED Disposition   Decision to Admit    Condition   --    Comment   Level of Care: Med/Surg [1]   Diagnosis: Generalized weakness [506579]   Admitting Physician: LANNY BALDERRAMA [[id number]]   Attending Physician: LANNY BALDERRAMA [[id number]]   Isolate for COVID?: No [0]   Certification: I Certify That Inpatient Hospital Services Are Medically Necessary For Greater Than 2 Midnights                 This medical record created using voice recognition software.             Johan Hansen MD  12/18/24 1153      Electronically signed by Johan Hansen MD at 12/18/24 1153       Neda Mccarty RN at 12/18/24 2420          Patient to ED from home via [facility] for generalized weakness x 4 days. Patient also reports back pain in the lumbar region from an unwitnessed fall that also occurred 4 days ago. Patients son states she is unable to ambulate due to the fall, has had n/v/d today.   Patient was treated for pneumonia Wednesday of last week with antibiotics, steroid.    Electronically signed  by Neda Mccarty RN at 12/18/24 0753       Facility-Administered Medications as of 12/18/2024   Medication Dose Route Frequency Provider Last Rate Last Admin    [COMPLETED] acetaminophen (OFIRMEV) injection 1,000 mg  1,000 mg Intravenous Once Johan Hansen MD   1,000 mg at 12/18/24 1010    acetaminophen (TYLENOL) tablet 650 mg  650 mg Oral Q4H PRN Teri Paez MD        Or    acetaminophen (TYLENOL) 160 MG/5ML oral solution 650 mg  650 mg Oral Q4H PRN Teri Paez MD        Or    acetaminophen (TYLENOL) suppository 650 mg  650 mg Rectal Q4H PRN Teri Paez MD        ALPRAZolam (XANAX) tablet 0.5 mg  0.5 mg Oral Q8H PRN Teri Paez MD        aluminum-magnesium hydroxide-simethicone (MAALOX MAX) 400-400-40 MG/5ML suspension 15 mL  15 mL Oral Q6H PRN Teri Paez MD        aspirin tablet 325 mg  325 mg Oral Daily Teri Paez MD   325 mg at 12/18/24 1524    atorvastatin (LIPITOR) tablet 10 mg  10 mg Oral Daily Teri Paez MD   10 mg at 12/18/24 1524    sennosides-docusate (PERICOLACE) 8.6-50 MG per tablet 2 tablet  2 tablet Oral BID PRN Teri Paez MD        And    polyethylene glycol (MIRALAX) packet 17 g  17 g Oral Daily PRN Teri Paez MD        And    bisacodyl (DULCOLAX) EC tablet 5 mg  5 mg Oral Daily PRN Teri Paez MD        And    bisacodyl (DULCOLAX) suppository 10 mg  10 mg Rectal Daily PRN Teri Paez MD        [COMPLETED] cefTRIAXone (ROCEPHIN) in NS 1 gram/10ml IV PUSH syringe  1,000 mg Intravenous Once Johan Hansen MD   1,000 mg at 12/18/24 1002    [START ON 12/19/2024] cefTRIAXone (ROCEPHIN) in NS 1 gram/10ml IV PUSH syringe  1,000 mg Intravenous Daily Teri Paez MD        Enoxaparin Sodium (LOVENOX) syringe 40 mg  40 mg Subcutaneous Daily Teri Paez MD   40 mg at 12/18/24 1524    famotidine (PEPCID) tablet 40 mg  40 mg Oral Daily Teri Paez MD   40 mg at 12/18/24 1524     [COMPLETED] iopamidol (ISOVUE-370) 76 % injection 100 mL  100 mL Intravenous Once in imaging Johan Hansen MD   100 mL at 12/18/24 0958    morphine injection 1 mg  1 mg Intravenous Q4H PRN Teri Paez MD        And    naloxone (NARCAN) injection 0.4 mg  0.4 mg Intravenous Q5 Min PRN Teri Paez MD        [COMPLETED] ondansetron (ZOFRAN) injection 4 mg  4 mg Intravenous Once Johan Hansen MD   4 mg at 12/18/24 1001    ondansetron (ZOFRAN) injection 4 mg  4 mg Intravenous Q6H PRN Teri Paez MD        oxyCODONE-acetaminophen (PERCOCET) 5-325 MG per tablet 1 tablet  1 tablet Oral Q8H PRN Teri Paez MD        [START ON 12/19/2024] PARoxetine (PAXIL) tablet 40 mg  40 mg Oral QAM Teri Paez MD        [COMPLETED] potassium chloride (MICRO-K/KLOR-CON) CR capsule  40 mEq Oral Once Johan Hansen MD   40 mEq at 12/18/24 1014    [COMPLETED] sodium chloride 0.9 % bolus 500 mL  500 mL Intravenous Once Johan Hansen MD   Stopped at 12/18/24 1040    sodium chloride 0.9 % flush 10 mL  10 mL Intravenous PRN Teri Paez MD         Orders (active)        Start     Ordered    12/19/24 0900  cefTRIAXone (ROCEPHIN) in NS 1 gram/10ml IV PUSH syringe  Daily         12/18/24 1917    12/19/24 0700  PARoxetine (PAXIL) tablet 40 mg  Every Morning         12/18/24 1309    12/19/24 0600  CBC & Differential  Morning Draw         12/18/24 1309    12/19/24 0600  Basic Metabolic Panel  Morning Draw         12/18/24 1309    12/18/24 1800  Oral Care  2 Times Daily       12/18/24 1309    12/18/24 1600  Vital Signs  Every 4 Hours       12/18/24 1309    12/18/24 1400  aspirin tablet 325 mg  Daily         12/18/24 1309    12/18/24 1400  atorvastatin (LIPITOR) tablet 10 mg  Daily         12/18/24 1309    12/18/24 1400  Strict Intake & Output  Every Hour       12/18/24 1309    12/18/24 1400  famotidine (PEPCID) tablet 40 mg  Daily         12/18/24 1309    12/18/24 1400  Enoxaparin Sodium  "(LOVENOX) syringe 40 mg  Daily         12/18/24 1309    12/18/24 1310  Daily Weights  Daily       12/18/24 1309    12/18/24 1308  morphine injection 1 mg  Every 4 Hours PRN        Placed in \"And\" Linked Group    12/18/24 1309    12/18/24 1308  naloxone (NARCAN) injection 0.4 mg  Every 5 Minutes PRN        Placed in \"And\" Linked Group    12/18/24 1309    12/18/24 1308  oxyCODONE-acetaminophen (PERCOCET) 5-325 MG per tablet 1 tablet  Every 8 Hours PRN         12/18/24 1309    12/18/24 1307  Code Status and Medical Interventions: CPR (Attempt to Resuscitate); Full Support  Continuous         12/18/24 1309    12/18/24 1307  Pulse Oximetry,  Spot  Once         12/18/24 1309    12/18/24 1307  Activity (Specify Details)  Until Discontinued         12/18/24 1309    12/18/24 1307  Turn Patient  Now Then Every 2 Hours         12/18/24 1309    12/18/24 1307  Diet: Regular/House; Fluid Consistency: Thin (IDDSI 0)  Diet Effective Now         12/18/24 1309    12/18/24 1307  Weigh Patient  Once         12/18/24 1309    12/18/24 1306  acetaminophen (TYLENOL) tablet 650 mg  Every 4 Hours PRN        Placed in \"Or\" Linked Group    12/18/24 1309    12/18/24 1306  acetaminophen (TYLENOL) 160 MG/5ML oral solution 650 mg  Every 4 Hours PRN        Placed in \"Or\" Linked Group    12/18/24 1309    12/18/24 1306  acetaminophen (TYLENOL) suppository 650 mg  Every 4 Hours PRN        Placed in \"Or\" Linked Group    12/18/24 1309    12/18/24 1306  aluminum-magnesium hydroxide-simethicone (MAALOX MAX) 400-400-40 MG/5ML suspension 15 mL  Every 6 Hours PRN         12/18/24 1309    12/18/24 1306  ALPRAZolam (XANAX) tablet 0.5 mg  Every 8 Hours PRN         12/18/24 1309    12/18/24 1306  sennosides-docusate (PERICOLACE) 8.6-50 MG per tablet 2 tablet  2 Times Daily PRN        Placed in \"And\" Linked Group    12/18/24 1309    12/18/24 1306  polyethylene glycol (MIRALAX) packet 17 g  Daily PRN        Placed in \"And\" Linked Group    12/18/24 1309    12/18/24 " "1306  bisacodyl (DULCOLAX) EC tablet 5 mg  Daily PRN        Placed in \"And\" Linked Group    12/18/24 1309    12/18/24 1306  bisacodyl (DULCOLAX) suppository 10 mg  Daily PRN        Placed in \"And\" Linked Group    12/18/24 1309    12/18/24 1306  ondansetron (ZOFRAN) injection 4 mg  Every 6 Hours PRN         12/18/24 1309    12/18/24 1127  Nephrology  (on-call MD unless specified)  Once        Specialty:  Nephrology  Provider:  Teri Paez MD    12/18/24 1127    12/18/24 1119  IP General Consult (Use specialty-specific consult if known)  Once        Provider:  Scott Allen MD    12/18/24 1119    12/18/24 0943  Urine Culture - Urine, Urine, Clean Catch  Once         12/18/24 0942    12/18/24 0813  Insert Peripheral IV  Once         12/18/24 0812    12/18/24 0813  Blood Culture - Blood, Arm, Left  Once         12/18/24 0812    12/18/24 0813  Blood Culture - Blood, Arm, Right  Once         12/18/24 0812    12/18/24 0812  sodium chloride 0.9 % flush 10 mL  As Needed         12/18/24 0812    Unscheduled  Up in Chair  As Needed       12/18/24 1309    Unscheduled  Up With Assistance  As Needed       12/18/24 1309                  "

## 2024-12-19 NOTE — CONSULTS
[facility]   Neurosurgery Consult    Patient Name:Cristina Pina  : 1935  MRN: [id number]  Primary Care Physician: Michael Serrano MD  Date of admission: 2024    Subjective   Subjective     Chief Complaint: Back pain after fall.     Weakness - Generalized  Symptoms include nausea and vomiting.    Nausea  Symptoms include nausea and vomiting.    Vomiting     Back Pain       Cristina Pina is a 88 y.o. female who fell backward and hit her back on a doorframe. She has had lower back pain since that time. Apparently also has a UTI per patient report. CT abd/pelvis showed an L3 compression fracture for which I was consulted. No new symptoms into the legs.     Review of Systems   Gastrointestinal:  Positive for nausea and vomiting.   Musculoskeletal:  Positive for back pain.         Personal History     Past Medical History:   Diagnosis Date    Depression     Hypertension        Past Surgical History:   Procedure Laterality Date    CARDIAC SURGERY      CHOLECYSTECTOMY      EYE SURGERY      HYSTERECTOMY      KNEE CLOSED REDUCTION      TOTAL KNEE ARTHROPLASTY         Family History: Her family history is not on file.     Social History: She  reports that she has never smoked. She has never used smokeless tobacco. She reports that she does not currently use alcohol. She reports that she does not use drugs.    Home Medications:  PARoxetine, PreserVision AREDS, albuterol sulfate HFA, aspirin, multivitamin with minerals, oxybutynin, potassium chloride, and simvastatin    Allergies:  She is allergic to ciprofibrate and sulfa antibiotics.    Objective    Objective     Vitals:    Temp:  [97.3 °F (36.3 °C)-99 °F (37.2 °C)] 97.3 °F (36.3 °C)  Heart Rate:  [51-88] 88  Resp:  [16-21] 20  BP: ()/(58-92) 137/92    Physical Exam  Constitutional:       Appearance: She is not ill-appearing.   Pulmonary:      Effort: Pulmonary effort is normal.   Neurological:      Mental Status: She is alert.      Sensory: No  sensory deficit.      Motor: No weakness.          Result Review    Result Review:  I have personally reviewed the results from the time of this admission to 12/19/2024 08:28 EST and agree with these findings:  []  Laboratory  []  Microbiology  [x]  Radiology  []  EKG/Telemetry   []  Cardiology/Vascular   []  Pathology  []  Old records  []  Other:  Most notable findings include: L3 compression fracture with no retropulsion. Not seen on CT from 2022.    Assessment & Plan   Assessment / Plan     Brief Patient Summary:  Cristina Pina is a 88 y.o. female with L3 compression fracture after fall.     Active Hospital Problems:  Active Hospital Problems    Diagnosis     **Generalized weakness     UTI (urinary tract infection)     Compression fracture of L3 vertebra      Plan:   Brace when upright. F/u in 6 weeks with repeat lumbar spine x-rays. No lifting more than 5 lbs.     VTE Prophylaxis:  Pharmacologic VTE prophylaxis orders are present.

## 2024-12-20 LAB — BACTERIA SPEC AEROBE CULT: ABNORMAL

## 2024-12-20 PROCEDURE — 25010000002 CEFTRIAXONE PER 250 MG: Performed by: STUDENT IN AN ORGANIZED HEALTH CARE EDUCATION/TRAINING PROGRAM

## 2024-12-20 PROCEDURE — 97161 PT EVAL LOW COMPLEX 20 MIN: CPT | Performed by: PHYSICAL THERAPIST

## 2024-12-20 PROCEDURE — 25010000002 ENOXAPARIN PER 10 MG: Performed by: STUDENT IN AN ORGANIZED HEALTH CARE EDUCATION/TRAINING PROGRAM

## 2024-12-20 RX ADMIN — PAROXETINE HYDROCHLORIDE 40 MG: 20 TABLET, FILM COATED ORAL at 08:03

## 2024-12-20 RX ADMIN — SODIUM CHLORIDE 1000 MG: 9 INJECTION INTRAMUSCULAR; INTRAVENOUS; SUBCUTANEOUS at 08:02

## 2024-12-20 RX ADMIN — ATORVASTATIN CALCIUM 10 MG: 10 TABLET, FILM COATED ORAL at 08:03

## 2024-12-20 RX ADMIN — ENOXAPARIN SODIUM 40 MG: 100 INJECTION SUBCUTANEOUS at 08:03

## 2024-12-20 RX ADMIN — OXYCODONE AND ACETAMINOPHEN 1 TABLET: 5; 325 TABLET ORAL at 21:30

## 2024-12-20 RX ADMIN — OXYCODONE AND ACETAMINOPHEN 1 TABLET: 5; 325 TABLET ORAL at 08:03

## 2024-12-20 RX ADMIN — ASPIRIN 325 MG: 325 TABLET ORAL at 08:03

## 2024-12-20 RX ADMIN — FAMOTIDINE 40 MG: 20 TABLET ORAL at 08:03

## 2024-12-20 NOTE — THERAPY EVALUATION
Acute Care - Physical Therapy Initial Evaluation   [facility]     Patient Name: Cristina Pina  : 1935  MRN: [id number]  Today's Date: 2024      Visit Dx:     ICD-10-CM ICD-9-CM   1. Generalized weakness  R53.1 780.79   2. Acute UTI (urinary tract infection)  N39.0 599.0   3. Acute right-sided low back pain without sciatica  M54.50 724.2   4. Fall, initial encounter  W19.XXXA E888.9   5. Compression fracture of L3 vertebra, initial encounter  S32.030A 805.4   6. Hypokalemia  E87.6 276.8   7. Difficulty in walking  R26.2 719.7     Patient Active Problem List   Diagnosis    Symptomatic bradycardia    Observed sleep apnea    Snoring    Non-restorative sleep    Essential hypertension, benign    Memory loss    Generalized weakness    UTI (urinary tract infection)    Compression fracture of L3 vertebra     Past Medical History:   Diagnosis Date    Depression     Hypertension      Past Surgical History:   Procedure Laterality Date    CARDIAC SURGERY      CHOLECYSTECTOMY      EYE SURGERY      HYSTERECTOMY      KNEE CLOSED REDUCTION      TOTAL KNEE ARTHROPLASTY       PT Assessment (Last 12 Hours)       PT Evaluation and Treatment       Row Name 24 1300          Physical Therapy Time and Intention    Subjective Information no complaints  -TC     Document Type evaluation  -TC     Mode of Treatment individual therapy;physical therapy  -TC     Patient Effort good  -TC       Row Name 24 1300          General Information    Patient Profile Reviewed yes  -TC     Patient Observations alert;cooperative  -TC     Prior Level of Function independent:;gait;transfer;bed mobility;ADL's  -TC     Existing Precautions/Restrictions fall  -TC     Barriers to Rehab none identified  -TC       Row Name 24 1300          Living Environment    Current Living Arrangements home  -TC     People in Home child(tavo), adult  -TC     Primary Care Provided by self  -TC       Row Name 24 1300          Home Use of  Assistive/Adaptive Equipment    Equipment Currently Used at Home walker, standard;bath bench;cane, straight;cpap  -TC       Row Name 12/20/24 1300          Pain    Pretreatment Pain Rating 0/10 - no pain  -TC     Posttreatment Pain Rating 1/10  -TC     Pain Location other (see comments)  left lower back  -TC     Pain Management Interventions other (see comments)  brace  -TC       Row Name 12/20/24 1300          Range of Motion (ROM)    Range of Motion bilateral lower extremities;ROM is WFL  -TC       Row Name 12/20/24 1300          Strength Comprehensive (MMT)    General Manual Muscle Testing (MMT) Assessment lower extremity strength deficits identified  -TC     Comment, General Manual Muscle Testing (MMT) Assessment BLE: 4-/5  -TC       Row Name 12/20/24 1300          Bed Mobility    Bed Mobility supine-sit-supine  -TC     Supine-Sit-Supine [place] (Bed Mobility) standby assist  -TC       Row Name 12/20/24 1300          Transfers    Transfers sit-stand transfer  -TC     Comment, (Transfers) PT provided VC and demo for proper placement of Rw during transfers due to pt leaving walker too away from pt body during transfer.  -TC       Row Name 12/20/24 1300          Sit-Stand Transfer    Sit-Stand [place] (Transfers) standby assist  -TC     Assistive Device (Sit-Stand Transfers) walker, 4-wheeled  -TC       Row Name 12/20/24 1300          Gait/Stairs (Locomotion)    Gait/Stairs Locomotion gait/ambulation assistive device  -TC     [place] Level (Gait) standby assist  -TC     Assistive Device (Gait) walker, 4-wheeled  -TC     Patient was able to Ambulate yes  -TC     Distance in Feet (Gait) 130  -TC     Deviations/Abnormal Patterns (Gait) other (see comments)  Forward trunk lean; VC for RW positioning during amb due to pushing walker too far forward.  -TC       Row Name 12/20/24 1300          Balance    Balance Assessment sitting dynamic balance;sitting static balance;standing static balance;standing  dynamic balance  -TC     Static Sitting Balance modified independence  -TC     Dynamic Sitting Balance modified independence  -TC     Position, Sitting Balance unsupported  -TC     Static Standing Balance standby assist  -TC     Dynamic Standing Balance standby assist  -TC     Position/Device Used, Standing Balance walker, 4-wheeled  -       Row Name 12/20/24 1300          Orthotics & Prosthetics Management    Additional Documentation --  Pt demo ability to don and doff trunk brace independently.  -       Row Name 12/20/24 1300          Plan of Care Review    Plan of Care Reviewed With patient  -TC     Outcome Evaluation Pt presents with decreased coordination and body awareness limiting her ability to perform transfers and ambulation without assist at this time. Intermittent VC for optimal placement of RW during ambulation and transfers needed for safety. Pt was able to don and doff brace indepednently. Pt would benefit from skilled PT to assits with improving strength and postural deficits for safety and decreased fall risk. Pt appears safe to return home with family s/p hospital discharge.  -       Row Name 12/20/24 1300          Positioning and Restraints    Pre-Treatment Position in bed  -TC     Post Treatment Position chair  -TC     In Chair sitting;call light within reach;exit alarm on  -       Row Name 12/20/24 1300          Therapy Assessment/Plan (PT)    Criteria for Skilled Interventions Met (PT) yes;meets criteria  -TC     Therapy Frequency (PT) daily  -TC     Predicted Duration of Therapy Intervention (PT) 10 days  -TC     Problem List (PT) problems related to;mobility;coordination;balance  -TC     Activity Limitations Related to Problem List (PT) unable to transfer safely;unable to ambulate safely  -       Row Name 12/20/24 1300          PT Evaluation Complexity    History, PT Evaluation Complexity 1-2 personal factors and/or comorbidities  -TC     Examination of Body Systems (PT Eval  Complexity) 1-2 elements  -TC     Clinical Presentation (PT Evaluation Complexity) stable  -TC     Clinical Decision Making (PT Evaluation Complexity) low complexity  -TC     Overall Complexity (PT Evaluation Complexity) low complexity  -TC       Row Name 12/20/24 1300          Physical Therapy Goals    Transfer Goal Selection (PT) transfer, PT goal 1  -TC     Gait Training Goal Selection (PT) gait training, PT goal 1  -TC       Row Name 12/20/24 1300          Transfer Goal 1 (PT)    Activity/Assistive Device (Transfer Goal 1, PT) sit-to-stand/stand-to-sit  -TC     [place] Level/Cues Needed (Transfer Goal 1, PT) modified independence  -TC     Time Frame (Transfer Goal 1, PT) 10 days  -TC       Row Name 12/20/24 1300          Gait Training Goal 1 (PT)    Activity/Assistive Device (Gait Training Goal 1, PT) assistive device use;walker, rolling  -TC     [place] Level (Gait Training Goal 1, PT) modified independence  -TC     Distance (Gait Training Goal 1, PT) 200  -TC     Time Frame (Gait Training Goal 1, PT) 10 days  -TC               User Key  (r) = Recorded By, (t) = Taken By, (c) = Cosigned By      Initials Name Provider Type    TC La Winter PT Physical Therapist                    Physical Therapy Education       Title: PT OT SLP Therapies (Not Started)       Topic: Physical Therapy (Not Started)       Point: Mobility training (Not Started)       Learner Progress:  Not documented in this visit.              Point: Home exercise program (Not Started)       Learner Progress:  Not documented in this visit.              Point: Body mechanics (Not Started)       Learner Progress:  Not documented in this visit.              Point: Precautions (Not Started)       Learner Progress:  Not documented in this visit.                                  PT Recommendation and Plan  Anticipated Discharge Disposition (PT): home with assist  Planned Therapy Interventions (PT): balance training, gait training,  strengthening, transfer training  Therapy Frequency (PT): daily  Plan of Care Reviewed With: patient  Outcome Evaluation: Pt presents with decreased coordination and body awareness limiting her ability to perform transfers and ambulation without assist at this time. Intermittent VC for optimal placement of RW during ambulation and transfers needed for safety. Pt was able to don and doff brace indepednently. Pt would benefit from skilled PT to assits with improving strength and postural deficits for safety and decreased fall risk. Pt appears safe to return home with family s/p hospital discharge.   Outcome Measures       Row Name 12/20/24 1300             How much help from another person do you currently need...    Turning from your back to your side while in flat bed without using bedrails? 4  -TC      Moving from lying on back to sitting on the side of a flat bed without bedrails? 4  -TC      Moving to and from a bed to a chair (including a wheelchair)? 3  -TC      Standing up from a chair using your arms (e.g., wheelchair, bedside chair)? 3  -TC      Climbing 3-5 steps with a railing? 2  -TC      To walk in hospital room? 3  -TC      AM-PAC 6 Clicks Score (PT) 19  -TC         Functional Assessment    Outcome Measure Options AM-PAC 6 Clicks Basic Mobility (PT)  -TC                User Key  (r) = Recorded By, (t) = Taken By, (c) = Cosigned By      Initials Name Provider Type    La Wang, PT Physical Therapist                     Time Calculation:    PT Charges       Row Name 12/20/24 1329             Time Calculation    PT Received On 12/20/24  -TC      PT Goal Re-Cert Due Date 12/29/24  -TC         Untimed Charges    PT Eval/Re-eval Minutes 45  -TC         Total Minutes    Untimed Charges Total Minutes 45  -TC       Total Minutes 45  -TC                User Key  (r) = Recorded By, (t) = Taken By, (c) = Cosigned By      Initials Name Provider Type    La Wang, PT Physical Therapist                   Therapy Charges for Today       Code Description Service Date Service Provider Modifiers Qty    69154418280 HC PT EVAL LOW COMPLEXITY 3 12/20/2024 La Winter, PT GP 1            PT G-Codes  Outcome Measure Options: AM-PAC 6 Clicks Basic Mobility (PT)  AM-PAC 6 Clicks Score (PT): 19    La Winter, PT  12/20/2024

## 2024-12-20 NOTE — PLAN OF CARE
Goal Outcome Evaluation:  Plan of Care Reviewed With: patient        Progress: no change  Outcome Evaluation: pt aox4, vs stable. reports minimal pain and refuses pain medications througout the night. rested well. compliant with wearing brace when out of bed.

## 2024-12-20 NOTE — PLAN OF CARE
Goal Outcome Evaluation:  Plan of Care Reviewed With: patient           Outcome Evaluation: Pt presents with decreased coordination and body awareness limiting her ability to perform transfers and ambulation without assist at this time. Intermittent VC for optimal placement of RW during ambulation and transfers needed for safety. Pt was able to don and doff brace indepednently. Pt would benefit from skilled PT to assits with improving strength and postural deficits for safety and decreased fall risk. Pt appears safe to return home with family s/p hospital discharge.    Anticipated Discharge Disposition (PT): home with assist

## 2024-12-20 NOTE — PROGRESS NOTES
[facility]     Progress Note    Patient Name: Cristina Pina  : 1935  MRN: [id number]  Primary Care Physician:  Michael Serrano MD  Date of admission: 2024    Subjective   Awaiting PT eval  No major acute events overnight    Scheduled Meds:aspirin, 325 mg, Oral, Daily  atorvastatin, 10 mg, Oral, Daily  cefTRIAXone, 1,000 mg, Intravenous, Daily  enoxaparin, 40 mg, Subcutaneous, Daily  famotidine, 40 mg, Oral, Daily  PARoxetine, 40 mg, Oral, QAM      Continuous Infusions:   PRN Meds:.  acetaminophen **OR** acetaminophen **OR** acetaminophen    ALPRAZolam    aluminum-magnesium hydroxide-simethicone    senna-docusate sodium **AND** polyethylene glycol **AND** bisacodyl **AND** bisacodyl    Morphine **AND** naloxone    ondansetron    oxyCODONE-acetaminophen    sodium chloride       Review of Systems  Constitutional:        Weakness tiredness fatigue  Eyes:                       No blurry vision, eye discharge, eye irritation, eye pain  HEENT:                   No acute hair loss, earache and discharge, nasal congestion or discharge, sore throat, postnasal drip  Respiratory:           No shortness of breath coughing sputum production wheezing hemoptysis pleuritic chest pain  Cardiovascular:     No chest pain, orthopnea, PND, dizziness, palpitation, lower extremity edema  Gastrointestinal:   No nausea vomiting diarrhea abdominal pain constipation  Genitourinary:       No urinary incontinence, hesitancy, frequency, urgency, dysuria  Hematologic:         No bruising, bleeding, pallor, lymphadenopathy  Endocrine:            No coldness, hot flashes, polyuria, abnormal hair growth  Musculoskeletal:    No body pains, aches, arthritic pains, muscle pain ,muscle wasting  Psychiatric:          No low or high mood, anxiety, hallucinations, delusions  Skin.                      No rash, ulcers, bruising, itching  Neurological:        No confusion, headache, focal weakness, numbness, dysphasia    Objective    Objective     Vitals:   Temp:  [97.3 °F (36.3 °C)-98.8 °F (37.1 °C)] 97.7 °F (36.5 °C)  Heart Rate:  [49-86] 54  Resp:  [18-20] 20  BP: ()/(55-99) 130/72  Physical Exam    Constitutional: Awake, alert responsive, conversant, no obvious distress              Psychiatric:  Appropriate affect, cooperative   Neurologic:  Awake alert ,oriented x 3, strength symmetric in all extremities, Cranial Nerves grossly intact to confrontation, speech clear   Eyes:   PERRLA, sclerae anicteric, no conjunctival injection   HEENT:  Moist mucous membranes, no nasal or eye discharge, no throat congestion   Neck:   Supple, no thyromegaly, no lymphadenopathy, trachea midline, no elevated JVD   Respiratory:  Clear to auscultation bilaterally, nonlabored respirations    Cardiovascular: RRR, no murmurs, rubs, or gallops, palpable pedal pulses bilaterally, No bilateral ankle edema   Gastrointestinal: Positive bowel sounds, soft, nontender, nondistended, no organomegaly   Musculoskeletal:  No clubbing or cyanosis to extremities,muscle wasting, joint swelling, muscle weakness             Skin:                      No rashes, bruising, skin ulcers, petechiae or ecchymosis    Result Review    Result Review:  I have personally reviewed the results from the time of this admission to 12/20/2024 07:18 EST and agree with these findings:  []  Laboratory  []  Microbiology  []  Radiology  []  EKG/Telemetry   []  Cardiology/Vascular   []  Pathology  []  Old records  []  Other:    Assessment & Plan   Assessment / Plan       Active Hospital Problems:  Active Hospital Problems    Diagnosis     **Generalized weakness     UTI (urinary tract infection)     Compression fracture of L3 vertebra      88-year-old female with past medical history of overactive bladder, dementia hyperlipidemia, anxiety/depression comes into the hospital due to generalized weakness that has been going on for the last 3 to 4 days complicated with a fall and acute L3-L4  compression fracture and UA concerning for UTI with cultures showing E. coli and inability to walk with family having a hard time caring to the patient     Plan:   Neurosurgery was consulted.  Conservative management with use of brace.  Appreciate the help in managing the patient  UA concerning for UTI and will continue with antibiotics for 5 days.  Cultures with E. coli in the urine  PT OT   to help with placement.

## 2024-12-20 NOTE — PLAN OF CARE
Goal Outcome Evaluation:              Outcome Evaluation: aox4. medicated for c/o pain per mar. back brace in place with ambulation and activity. fall risk measures in place. frequently turned and repositioned. diarrhea noted this AM, no other episodes throughout the remainder of the shift. possible dc home 12/21.

## 2024-12-21 ENCOUNTER — READMISSION MANAGEMENT (OUTPATIENT)
Dept: CALL CENTER | Facility: HOSPITAL | Age: 89
End: 2024-12-21
Payer: MEDICARE

## 2024-12-21 VITALS
OXYGEN SATURATION: 95 % | DIASTOLIC BLOOD PRESSURE: 75 MMHG | SYSTOLIC BLOOD PRESSURE: 138 MMHG | HEART RATE: 51 BPM | TEMPERATURE: 97.7 F | RESPIRATION RATE: 18 BRPM | WEIGHT: 196.65 LBS | HEIGHT: 67 IN | BODY MASS INDEX: 30.87 KG/M2

## 2024-12-21 PROBLEM — N39.0 UTI (URINARY TRACT INFECTION): Status: RESOLVED | Noted: 2024-12-18 | Resolved: 2024-12-21

## 2024-12-21 LAB
QT INTERVAL: 498 MS
QTC INTERVAL: 522 MS

## 2024-12-21 PROCEDURE — 25010000002 CEFTRIAXONE PER 250 MG: Performed by: STUDENT IN AN ORGANIZED HEALTH CARE EDUCATION/TRAINING PROGRAM

## 2024-12-21 PROCEDURE — 25010000002 ENOXAPARIN PER 10 MG: Performed by: STUDENT IN AN ORGANIZED HEALTH CARE EDUCATION/TRAINING PROGRAM

## 2024-12-21 PROCEDURE — 93005 ELECTROCARDIOGRAM TRACING: CPT | Performed by: STUDENT IN AN ORGANIZED HEALTH CARE EDUCATION/TRAINING PROGRAM

## 2024-12-21 RX ORDER — CEFDINIR 300 MG/1
300 CAPSULE ORAL 2 TIMES DAILY
Qty: 6 CAPSULE | Refills: 0 | Status: SHIPPED | OUTPATIENT
Start: 2024-12-22 | End: 2024-12-25

## 2024-12-21 RX ADMIN — ENOXAPARIN SODIUM 40 MG: 100 INJECTION SUBCUTANEOUS at 08:14

## 2024-12-21 RX ADMIN — SODIUM CHLORIDE 1000 MG: 9 INJECTION INTRAMUSCULAR; INTRAVENOUS; SUBCUTANEOUS at 08:13

## 2024-12-21 RX ADMIN — ATORVASTATIN CALCIUM 10 MG: 10 TABLET, FILM COATED ORAL at 08:13

## 2024-12-21 RX ADMIN — FAMOTIDINE 40 MG: 20 TABLET ORAL at 08:13

## 2024-12-21 RX ADMIN — ASPIRIN 325 MG: 325 TABLET ORAL at 08:13

## 2024-12-21 RX ADMIN — PAROXETINE HYDROCHLORIDE 40 MG: 20 TABLET, FILM COATED ORAL at 07:05

## 2024-12-21 NOTE — DISCHARGE SUMMARY
[facility]   DISCHARGE SUMMARY    Patient Name: Cristina Pina  : 1935  MRN: [id number]    Date of Admission: 2024  Date of Discharge: 2024  Primary Care Physician: Michael Serrano MD    Consults       Date and Time Order Name Status Description    2024 11:27 AM Nephrology  (on-call MD unless specified)      2024 11:19 AM IP General Consult (Use specialty-specific consult if known)              Hospital Course     Presenting Problem:   Hypokalemia [E87.6]  Generalized weakness [R53.1]  Acute UTI (urinary tract infection) [N39.0]  Fall, initial encounter [W19.XXXA]  Acute right-sided low back pain without sciatica [M54.50]  Compression fracture of L3 vertebra, initial encounter [S32.030A]    Active and resolved problems  Principal Problem:    Generalized weakness  Overview:  Active Problems:    Compression fracture of L3 vertebra  Overview:  Resolved Problems:    UTI (urinary tract infection)  Overview:     Hospital Course:  Cristina Pina is a 88 y.o. female 88-year-old female with past medical history of overactive bladder, dementia hyperlipidemia, anxiety/depression comes into the hospital due to generalized weakness that has been going on for the last 3 to 4 days complicated with a fall and acute L3-L4 compression fracture and UA concerning for UTI with cultures showing E. coli and inability to walk with family having a hard time caring to the patient.  E. coli was sensitive to cephalosporins and patient has been transition to cefdinir to complete course of antibiotics.  With the  brace, patient is ambulating better and in discussion with family, plan to discharge the patient home with follow-up with PCP and neurosurgery with the latter in about a month and will need repeat lumbar spine x-rays.  Recommended no lifting in the meanwhile.  Patient at the time of discharge, was noted to be in A-fib but no RVR.  Due to high risk of fall and high has bled score, decision  to hold off on anticoagulation at this time and continue aspirin 365.  Referral for cardiology also set up.  Will hold off on echo at this time on discharge      Vital Signs:  Temp:  [97.2 °F (36.2 °C)-98.2 °F (36.8 °C)] 97.7 °F (36.5 °C)  Heart Rate:  [51-76] 51  Resp:  [18-20] 18  BP: (108-155)/(64-92) 138/75      Discharge Details        Discharge Medications        New Medications        Instructions Start Date   cefdinir 300 MG capsule  Commonly known as: OMNICEF   300 mg, Oral, 2 Times Daily   Start Date: December 22, 2024            Continue These Medications        Instructions Start Date   albuterol sulfate  (90 Base) MCG/ACT inhaler  Commonly known as: PROVENTIL HFA;VENTOLIN HFA;PROAIR HFA   2 puffs, Inhalation, Every 4 Hours PRN      aspirin 325 MG tablet   325 mg, Oral, Daily      multivitamin with minerals tablet tablet   1 tablet, Oral, Daily      PreserVision AREDS capsule   1 capsule, Oral, Daily      oxybutynin 5 MG tablet  Commonly known as: DITROPAN   1 tablet, Oral, Every 12 Hours Scheduled      Paxil 40 MG tablet  Generic drug: PARoxetine   40 mg, Oral, Every Morning      potassium chloride 20 MEQ CR tablet  Commonly known as: KLOR-CON M20   1 tablet, Oral, Daily      simvastatin 20 MG tablet  Commonly known as: ZOCOR   20 mg, Oral, Daily               Allergies   Allergen Reactions   • Ciprofibrate Hives   • Sulfa Antibiotics Hives         Discharge Disposition:  Home or Self Care    Diet:  As tolerated      Discharge Activity:   As tolerated with no lifting      CODE STATUS:    Code Status and Medical Interventions: CPR (Attempt to Resuscitate); Full Support   Ordered at: 12/18/24 1309     Code Status (Patient has no pulse and is not breathing):    CPR (Attempt to Resuscitate)     Medical Interventions (Patient has pulse or is breathing):    Full Support         Time spent on Discharge including face to face service: 20 minutes minutes    Electronically signed by Teri Paez MD,  12/21/24, 8:34 AM EST.

## 2024-12-21 NOTE — PLAN OF CARE
Goal Outcome Evaluation:  Plan of Care Reviewed With: patient        Progress: no change  Outcome Evaluation: PRN pain med given for reported low back pain. up to bathroom with walker and standy assist. Says she is to go home with son on 12/21 and is looking forward to it

## 2024-12-21 NOTE — PLAN OF CARE
Goal Outcome Evaluation:      Aox4. Back brace in place with ambulation and activity. No c/o pain. Up with standby assist and walker. Heart irregular, attending MD notified, EKG obtained. Discharge instructions provided to patient at bedside. Discharge home via private vehicle.

## 2024-12-22 NOTE — OUTREACH NOTE
Prep Survey      Flowsheet Row Responses   [facility] facility patient discharged from? [facility]   Is LACE score < 7 ? No   Eligibility Not Eligible   What are the reasons patient is not eligible? Other  [low risk for readmit]   Does the patient have one of the following disease processes/diagnoses(primary or secondary)? Other   Prep survey completed? Yes            OLIVER FLORIAN - Registered Nurse

## 2024-12-23 DIAGNOSIS — R53.1 GENERALIZED WEAKNESS: ICD-10-CM

## 2024-12-23 DIAGNOSIS — M54.50 ACUTE RIGHT-SIDED LOW BACK PAIN WITHOUT SCIATICA: ICD-10-CM

## 2024-12-23 DIAGNOSIS — S32.030A COMPRESSION FRACTURE OF L3 VERTEBRA, INITIAL ENCOUNTER: Primary | ICD-10-CM

## 2024-12-23 LAB
BACTERIA SPEC AEROBE CULT: NORMAL
BACTERIA SPEC AEROBE CULT: NORMAL

## 2024-12-26 LAB
QT INTERVAL: 498 MS
QTC INTERVAL: 522 MS

## 2025-01-07 ENCOUNTER — TELEPHONE (OUTPATIENT)
Dept: NEUROSURGERY | Facility: CLINIC | Age: 89
End: 2025-01-07
Payer: MEDICARE

## 2025-01-07 NOTE — TELEPHONE ENCOUNTER
Caller: ASTRID    Relationship: SON    Best call back number: [phone]    What was the call regarding: PATIETNS SON CLAYTON CALLED TO SCHEDULE PATIENT - OFFICE IS WAS NOT OPENED UP YET DUE TO WEAHTER     HOSP F/U: Compression fracture of L3 vertebra, initial encounter, CT ABD PELV 12/18/24 @ [facility]      PATIENT WAS CONSULTED BY [facility] IN ED 12/19/24, PER NOTES: F/u in 6 weeks with repeat lumbar spine x-rays. PATIENT FOUND IN C/E, NOT NS RELATED.    PLEASE CALL HIM TO SCHEDULE APPT  THANK YOU

## 2025-01-27 ENCOUNTER — HOSPITAL ENCOUNTER (OUTPATIENT)
Dept: GENERAL RADIOLOGY | Facility: HOSPITAL | Age: 89
Discharge: HOME | End: 2025-01-27
Admitting: NEUROLOGICAL SURGERY
Payer: MEDICARE

## 2025-01-27 DIAGNOSIS — S32.030A COMPRESSION FRACTURE OF L3 VERTEBRA, INITIAL ENCOUNTER: ICD-10-CM

## 2025-01-27 DIAGNOSIS — R53.1 GENERALIZED WEAKNESS: ICD-10-CM

## 2025-01-27 DIAGNOSIS — M54.50 ACUTE RIGHT-SIDED LOW BACK PAIN WITHOUT SCIATICA: ICD-10-CM

## 2025-01-27 PROCEDURE — 72100 X-RAY EXAM L-S SPINE 2/3 VWS: CPT

## 2025-01-28 ENCOUNTER — OFFICE VISIT (OUTPATIENT)
Dept: NEUROSURGERY | Facility: CLINIC | Age: 89
End: 2025-01-28
Payer: MEDICARE

## 2025-01-28 VITALS
SYSTOLIC BLOOD PRESSURE: 161 MMHG | BODY MASS INDEX: 30.54 KG/M2 | HEIGHT: 67 IN | DIASTOLIC BLOOD PRESSURE: 99 MMHG | WEIGHT: 194.6 LBS

## 2025-01-28 DIAGNOSIS — S32.030D COMPRESSION FRACTURE OF L3 VERTEBRA WITH ROUTINE HEALING, SUBSEQUENT ENCOUNTER: Primary | ICD-10-CM

## 2025-01-28 PROCEDURE — 99213 OFFICE O/P EST LOW 20 MIN: CPT | Performed by: NEUROLOGICAL SURGERY

## 2025-01-28 PROCEDURE — 1159F MED LIST DOCD IN RCRD: CPT | Performed by: NEUROLOGICAL SURGERY

## 2025-01-28 PROCEDURE — 1160F RVW MEDS BY RX/DR IN RCRD: CPT | Performed by: NEUROLOGICAL SURGERY

## 2025-01-28 NOTE — PROGRESS NOTES
Cristina Pina is a 89 y.o. female that presents with L3 compression fracture       HPI    History of Present Illness  The patient is an 89-year-old female who presents for evaluation of back pain.    She reports persistent severe back pain, which she attributes to a previous fall where she landed on her back. She has been utilizing a walker for mobility but has not been wearing her brace due to associated discomfort. She expresses interest in understanding if the brace could potentially alleviate her symptoms. She reports no weakness in her lower extremities.     Review of Systems   Musculoskeletal:  Positive for back pain.        Vitals:    01/28/25 1100   BP: 161/99        Physical Exam  Constitutional:       Appearance: She is normal weight. She is not ill-appearing.   Pulmonary:      Effort: Pulmonary effort is normal.   Neurological:      Mental Status: She is alert.      Motor: No weakness.          Results  Imaging  X-rays of the back show minimal difference compared to previous x-rays from December 18, 2021.        Assessment and Plan {CC Problem List  Visit Diagnosis  ROS  Review (Popup)  Health Maintenance  Quality  BestPractice  Medications  SmartSets  SnapShot Encounters  Media :23}   Problem List Items Addressed This Visit       Compression fracture of L3 vertebra - Primary    Relevant Orders    XR Spine Lumbar 2 or 3 View       Assessment & Plan  1. Fracture at L3.  The patient's condition remains stable with no significant deterioration observed. The fracture appears minimally worse, but this change is not substantial. She is currently 6 weeks post-injury. She was advised to avoid heavy lifting, bending, or twisting movements. Walking is encouraged, and the use of a walker is recommended to prevent falls. She was informed that the brace could be beneficial if it provides comfort; otherwise, it is not necessary. Good posture is emphasized. A follow-up appointment is scheduled for 6 weeks  from now, during which another set of x-rays will be conducted to assess the healing process.    Follow-up  The patient will follow up in 6 weeks.       Follow Up {Instructions Charge Capture  Follow-up Communications :23}   Return in about 6 weeks (around 3/11/2025).      Patient or patient representative verbalized consent for the use of Ambient Listening during the visit with  Scott Allen MD for chart documentation. 1/28/2025  11:14 EST

## 2025-03-10 ENCOUNTER — HOSPITAL ENCOUNTER (OUTPATIENT)
Dept: GENERAL RADIOLOGY | Facility: HOSPITAL | Age: 89
Discharge: HOME | End: 2025-03-10
Admitting: NEUROLOGICAL SURGERY
Payer: MEDICARE

## 2025-03-10 DIAGNOSIS — S32.030D COMPRESSION FRACTURE OF L3 VERTEBRA WITH ROUTINE HEALING, SUBSEQUENT ENCOUNTER: ICD-10-CM

## 2025-03-10 PROCEDURE — 72100 X-RAY EXAM L-S SPINE 2/3 VWS: CPT

## 2025-03-11 ENCOUNTER — OFFICE VISIT (OUTPATIENT)
Dept: NEUROSURGERY | Facility: CLINIC | Age: 89
End: 2025-03-11
Payer: MEDICARE

## 2025-03-11 VITALS
DIASTOLIC BLOOD PRESSURE: 78 MMHG | HEIGHT: 67 IN | BODY MASS INDEX: 30.64 KG/M2 | WEIGHT: 195.2 LBS | SYSTOLIC BLOOD PRESSURE: 135 MMHG

## 2025-03-11 DIAGNOSIS — S32.030D COMPRESSION FRACTURE OF L3 VERTEBRA WITH ROUTINE HEALING, SUBSEQUENT ENCOUNTER: Primary | ICD-10-CM

## 2025-03-11 NOTE — PROGRESS NOTES
Cristina Pina is a 89 y.o. female that presents with Back Pain       Back Pain      History of Present Illness  The patient is an 89-year-old female who presents for back pain.    She reports a significant improvement in her back pain, which she initially experienced in December 2024. She has no new symptoms of numbness or tingling. Her bone density was previously assessed and found to be within normal limits.       Review of Systems   Musculoskeletal:  Positive for back pain.      Vitals:    03/11/25 1256   BP: 135/78        Physical Exam  Neurological:      Mental Status: She is alert.      Motor: No weakness.   Psychiatric:         Mood and Affect: Mood normal.          Results  Imaging  X-rays of the back show no worsening of the condition. L3 fracture stable.           Assessment and Plan {CC Problem List  Visit Diagnosis  ROS  Review (Popup)  Dashbook Maintenance  Quality  BestPractice  Medications  SmartSets  SnapShot Encounters  Media :23}   Problem List Items Addressed This Visit       Compression fracture of L3 vertebra - Primary       Assessment & Plan  1. Back pain.  Her x-ray results from yesterday and late January 2025 indicate a stable condition, with no observed bone protrusion into the canal. The radiologist has not yet reviewed the most recent x-ray. She is advised to exercise caution to prevent falls and subsequent fractures. Walking and low-impact exercises are encouraged, but strenuous activities should be avoided. She should gradually resume her normal activities. If there is any new or worsening pain, she should return for further evaluation.       Follow Up {Instructions Charge Capture  Follow-up Communications :23}   No follow-ups on file.      Patient or patient representative verbalized consent for the use of Ambient Listening during the visit with  Scott Allen MD for chart documentation. 3/11/2025  13:03 EDT

## 2025-07-21 ENCOUNTER — HOSPITAL ENCOUNTER (EMERGENCY)
Facility: HOSPITAL | Age: 89
Discharge: HOME | End: 2025-07-22
Attending: EMERGENCY MEDICINE | Admitting: EMERGENCY MEDICINE
Payer: MEDICARE

## 2025-07-21 DIAGNOSIS — R11.0 NAUSEA: ICD-10-CM

## 2025-07-21 DIAGNOSIS — R19.7 DIARRHEA, UNSPECIFIED TYPE: Primary | ICD-10-CM

## 2025-07-21 PROCEDURE — 99283 EMERGENCY DEPT VISIT LOW MDM: CPT

## 2025-07-22 VITALS
OXYGEN SATURATION: 91 % | BODY MASS INDEX: 31.28 KG/M2 | HEART RATE: 68 BPM | WEIGHT: 199.3 LBS | DIASTOLIC BLOOD PRESSURE: 78 MMHG | RESPIRATION RATE: 16 BRPM | SYSTOLIC BLOOD PRESSURE: 166 MMHG | TEMPERATURE: 98.1 F | HEIGHT: 67 IN

## 2025-07-22 LAB
ALBUMIN SERPL-MCNC: 4.4 G/DL (ref 3.5–5.2)
ALBUMIN/GLOB SERPL: 1.5 G/DL
ALP SERPL-CCNC: 80 U/L (ref 39–117)
ALT SERPL W P-5'-P-CCNC: 13 U/L (ref 1–33)
ANION GAP SERPL CALCULATED.3IONS-SCNC: 14.1 MMOL/L (ref 5–15)
AST SERPL-CCNC: 31 U/L (ref 1–32)
BACTERIA UR QL AUTO: ABNORMAL /HPF
BASOPHILS # BLD AUTO: 0.04 10*3/MM3 (ref 0–0.2)
BASOPHILS NFR BLD AUTO: 0.3 % (ref 0–1.5)
BILIRUB SERPL-MCNC: 0.7 MG/DL (ref 0–1.2)
BILIRUB UR QL STRIP: NEGATIVE
BUN SERPL-MCNC: 11.7 MG/DL (ref 8–23)
BUN/CREAT SERPL: 13.8 (ref 7–25)
CALCIUM SPEC-SCNC: 9.5 MG/DL (ref 8.6–10.5)
CHLORIDE SERPL-SCNC: 101 MMOL/L (ref 98–107)
CLARITY UR: CLEAR
CO2 SERPL-SCNC: 24.9 MMOL/L (ref 22–29)
COLOR UR: YELLOW
CREAT SERPL-MCNC: 0.85 MG/DL (ref 0.57–1)
D-LACTATE SERPL-SCNC: 1.5 MMOL/L (ref 0.5–2)
DEPRECATED RDW RBC AUTO: 50.3 FL (ref 37–54)
EGFRCR SERPLBLD CKD-EPI 2021: 66.4 ML/MIN/1.73
EOSINOPHIL # BLD AUTO: 0.07 10*3/MM3 (ref 0–0.4)
EOSINOPHIL NFR BLD AUTO: 0.5 % (ref 0.3–6.2)
ERYTHROCYTE [DISTWIDTH] IN BLOOD BY AUTOMATED COUNT: 13.7 % (ref 12.3–15.4)
GLOBULIN UR ELPH-MCNC: 2.9 GM/DL
GLUCOSE SERPL-MCNC: 116 MG/DL (ref 65–99)
GLUCOSE UR STRIP-MCNC: NEGATIVE MG/DL
HCT VFR BLD AUTO: 42.8 % (ref 34–46.6)
HGB BLD-MCNC: 14.4 G/DL (ref 12–15.9)
HGB UR QL STRIP.AUTO: NEGATIVE
HOLD SPECIMEN: NORMAL
HOLD SPECIMEN: NORMAL
HYALINE CASTS UR QL AUTO: ABNORMAL /LPF
IMM GRANULOCYTES # BLD AUTO: 0.09 10*3/MM3 (ref 0–0.05)
IMM GRANULOCYTES NFR BLD AUTO: 0.6 % (ref 0–0.5)
KETONES UR QL STRIP: ABNORMAL
LEUKOCYTE ESTERASE UR QL STRIP.AUTO: ABNORMAL
LIPASE SERPL-CCNC: 24 U/L (ref 13–60)
LYMPHOCYTES # BLD AUTO: 0.53 10*3/MM3 (ref 0.7–3.1)
LYMPHOCYTES NFR BLD AUTO: 3.8 % (ref 19.6–45.3)
MAGNESIUM SERPL-MCNC: 2 MG/DL (ref 1.6–2.4)
MCH RBC QN AUTO: 33.2 PG (ref 26.6–33)
MCHC RBC AUTO-ENTMCNC: 33.6 G/DL (ref 31.5–35.7)
MCV RBC AUTO: 98.6 FL (ref 79–97)
MONOCYTES # BLD AUTO: 1.12 10*3/MM3 (ref 0.1–0.9)
MONOCYTES NFR BLD AUTO: 8 % (ref 5–12)
NEUTROPHILS NFR BLD AUTO: 12.11 10*3/MM3 (ref 1.7–7)
NEUTROPHILS NFR BLD AUTO: 86.8 % (ref 42.7–76)
NITRITE UR QL STRIP: NEGATIVE
NRBC BLD AUTO-RTO: 0 /100 WBC (ref 0–0.2)
PH UR STRIP.AUTO: 5.5 [PH] (ref 5–8)
PLATELET # BLD AUTO: 255 10*3/MM3 (ref 140–450)
PMV BLD AUTO: 9.7 FL (ref 6–12)
POTASSIUM SERPL-SCNC: 3.4 MMOL/L (ref 3.5–5.2)
PROT SERPL-MCNC: 7.3 G/DL (ref 6–8.5)
PROT UR QL STRIP: NEGATIVE
RBC # BLD AUTO: 4.34 10*6/MM3 (ref 3.77–5.28)
RBC # UR STRIP: ABNORMAL /HPF
REF LAB TEST METHOD: ABNORMAL
SODIUM SERPL-SCNC: 140 MMOL/L (ref 136–145)
SP GR UR STRIP: 1.02 (ref 1–1.03)
SQUAMOUS #/AREA URNS HPF: ABNORMAL /HPF
UROBILINOGEN UR QL STRIP: ABNORMAL
WBC # UR STRIP: ABNORMAL /HPF
WBC NRBC COR # BLD AUTO: 13.96 10*3/MM3 (ref 3.4–10.8)
WHOLE BLOOD HOLD COAG: NORMAL
WHOLE BLOOD HOLD SPECIMEN: NORMAL

## 2025-07-22 PROCEDURE — 80053 COMPREHEN METABOLIC PANEL: CPT | Performed by: NURSE PRACTITIONER

## 2025-07-22 PROCEDURE — 83690 ASSAY OF LIPASE: CPT | Performed by: NURSE PRACTITIONER

## 2025-07-22 PROCEDURE — 83605 ASSAY OF LACTIC ACID: CPT | Performed by: NURSE PRACTITIONER

## 2025-07-22 PROCEDURE — 85025 COMPLETE CBC W/AUTO DIFF WBC: CPT | Performed by: NURSE PRACTITIONER

## 2025-07-22 PROCEDURE — 81001 URINALYSIS AUTO W/SCOPE: CPT | Performed by: NURSE PRACTITIONER

## 2025-07-22 PROCEDURE — 83735 ASSAY OF MAGNESIUM: CPT | Performed by: EMERGENCY MEDICINE

## 2025-07-22 RX ORDER — SODIUM CHLORIDE 0.9 % (FLUSH) 0.9 %
10 SYRINGE (ML) INJECTION AS NEEDED
Status: DISCONTINUED | OUTPATIENT
Start: 2025-07-22 | End: 2025-07-22 | Stop reason: HOSPADM

## 2025-07-22 RX ORDER — DOXYCYCLINE 100 MG/1
100 CAPSULE ORAL 2 TIMES DAILY
Qty: 14 CAPSULE | Refills: 0 | Status: SHIPPED | OUTPATIENT
Start: 2025-07-22 | End: 2025-07-29

## 2025-07-22 RX ORDER — ONDANSETRON 4 MG/1
8 TABLET, ORALLY DISINTEGRATING ORAL EVERY 8 HOURS PRN
Qty: 15 TABLET | Refills: 0 | Status: SHIPPED | OUTPATIENT
Start: 2025-07-22 | End: 2025-07-27

## 2025-07-22 NOTE — ED PROVIDER NOTES
Time: 1:48 AM EDT  Date of encounter:  7/21/2025  Independent Historian/Clinical History and Information was obtained by:   Patient/son  Chief Complaint: Diarrhea    History is limited by: N/A    History of Present Illness:  Patient is a 89 y.o. year old female who presents to the emergency department for evaluation of diarrhea.  The son notes the patient was bit by a cat on Sunday.  The patient's knuckle on the left hand for the cat bit her became red today they went to urgent care where she got a tetanus the wound was washed and she was started on antibiotics.  The patient did take 1 dose.  The son believes it is Augmentin.  Shortly after she developed nausea and some dry heaves although did not vomit.  She has no fever rigors or myalgias.  She has no abdominal pain.  He states that she had watery stool for about an hour and then its resolved.  She has not had any watery stool in several hours.  There was no hematochezia or melena.  Patient's had no recent hospitalization.  The patient does have a history of C. difficile colitis.  Both the patient and son note that the patient's interphalangeal joint on the left hand is better is significantly better    HPI    Patient Care Team  Primary Care Provider: Michael Serrano MD    Past Medical History:     Allergies   Allergen Reactions    Ciprofibrate Hives    Sulfa Antibiotics Hives     Past Medical History:   Diagnosis Date    Depression     Disease of thyroid gland     Hypertension      Past Surgical History:   Procedure Laterality Date    CARDIAC SURGERY      CHOLECYSTECTOMY      EYE SURGERY      HYSTERECTOMY      KNEE CLOSED REDUCTION      TOTAL KNEE ARTHROPLASTY       Family History   Family history unknown: Yes       Home Medications:  Prior to Admission medications    Medication Sig Start Date End Date Taking? Authorizing Provider   albuterol sulfate  (90 Base) MCG/ACT inhaler Inhale 2 puffs Every 4 (Four) Hours As Needed for Wheezing.    Emergency,  Nurse MARIELLA Shin   amoxicillin-clavulanate (AUGMENTIN) 875-125 MG per tablet Take 1 tablet by mouth 2 (Two) Times a Day for 5 days. 7/21/25 7/26/25  Hyu Villa MD   levothyroxine (SYNTHROID, LEVOTHROID) 150 MCG tablet Daily.    Anjali Cantu MD   Multiple Vitamins-Minerals (PreserVision AREDS) capsule Take 1 capsule by mouth Daily.    Anjali Cantu MD   multivitamin with minerals tablet tablet Take 1 tablet by mouth Daily.    Emergency, Nurse MARIELLA Shin   oxybutynin (DITROPAN) 5 MG tablet Take 1 tablet by mouth Every 12 (Twelve) Hours. 11/7/24   Anjali Cantu MD   Paxil 40 MG tablet Take 1 tablet by mouth Every Morning.    Anjali Cantu MD   potassium chloride (KLOR-CON M20) 20 MEQ CR tablet Take 1 tablet by mouth Daily.    Anjali Cantu MD   simvastatin (ZOCOR) 20 MG tablet Take 1 tablet by mouth Daily.    Emergency, Nurse MARIELLA Shin        Social History:   Social History     Tobacco Use    Smoking status: Never    Smokeless tobacco: Never   Vaping Use    Vaping status: Never Used   Substance Use Topics    Alcohol use: Not Currently    Drug use: Never         Review of Systems:  Review of Systems   Constitutional:  Negative for chills, diaphoresis and fever.   HENT:  Negative for congestion, postnasal drip, rhinorrhea and sore throat.    Eyes:  Negative for photophobia.   Respiratory:  Negative for cough, chest tightness and shortness of breath.    Cardiovascular:  Negative for chest pain, palpitations and leg swelling.   Gastrointestinal:  Positive for diarrhea and nausea. Negative for abdominal pain and vomiting.   Genitourinary:  Negative for difficulty urinating, dysuria, flank pain, frequency, hematuria and urgency.   Musculoskeletal:  Positive for arthralgias and joint swelling. Negative for neck pain and neck stiffness.   Skin:  Positive for wound. Negative for pallor and rash.   Neurological:  Negative for dizziness, syncope, weakness, numbness and headaches.  "  Hematological:  Negative for adenopathy. Does not bruise/bleed easily.   Psychiatric/Behavioral: Negative.          Physical Exam:  /78   Pulse 63   Temp 97.6 °F (36.4 °C) (Oral)   Resp 16   Ht 170.2 cm (67\")   Wt 90.4 kg (199 lb 4.7 oz)   SpO2 94%   BMI 31.21 kg/m²     Physical Exam  Vitals and nursing note reviewed.   Constitutional:       General: She is not in acute distress.     Appearance: Normal appearance. She is not ill-appearing, toxic-appearing or diaphoretic.   HENT:      Head: Normocephalic and atraumatic.      Mouth/Throat:      Mouth: Mucous membranes are moist.   Eyes:      Pupils: Pupils are equal, round, and reactive to light.   Cardiovascular:      Rate and Rhythm: Normal rate and regular rhythm.      Pulses: Normal pulses.           Carotid pulses are 2+ on the right side and 2+ on the left side.       Radial pulses are 2+ on the right side and 2+ on the left side.        Femoral pulses are 2+ on the right side and 2+ on the left side.       Popliteal pulses are 2+ on the right side and 2+ on the left side.        Dorsalis pedis pulses are 2+ on the right side and 2+ on the left side.        Posterior tibial pulses are 2+ on the right side and 2+ on the left side.      Heart sounds: Normal heart sounds. No murmur heard.  Pulmonary:      Effort: Pulmonary effort is normal. No accessory muscle usage, respiratory distress or retractions.      Breath sounds: Normal breath sounds. No wheezing, rhonchi or rales.   Abdominal:      General: Abdomen is flat. There is no distension.      Palpations: Abdomen is soft. There is no mass or pulsatile mass.      Tenderness: There is no abdominal tenderness. There is no right CVA tenderness, left CVA tenderness, guarding or rebound.      Comments: No rigidity   Musculoskeletal:         General: No swelling, tenderness or deformity.      Cervical back: Neck supple. No tenderness.      Right lower leg: No edema.      Left lower leg: No edema.      " Comments: The patient appears to have small puncture wounds at the right middle finger distal interphalangeal joint.  There is mild if any erythema.  Patient has good range of motion.  There is no discharge.  It is minimally tender.  There is no red streaking.  There is no evidence for tenosynovitis   Skin:     General: Skin is warm and dry.      Capillary Refill: Capillary refill takes less than 2 seconds.      Coloration: Skin is not jaundiced or pale.      Findings: No erythema.   Neurological:      General: No focal deficit present.      Mental Status: She is alert and oriented to person, place, and time. Mental status is at baseline.      Cranial Nerves: Cranial nerves 2-12 are intact. No cranial nerve deficit.      Sensory: Sensation is intact. No sensory deficit.      Motor: Motor function is intact. No weakness or pronator drift.      Coordination: Coordination is intact. Coordination normal.   Psychiatric:         Mood and Affect: Mood normal.         Behavior: Behavior normal.                  Procedures:  Procedures      Medical Decision Making:      Comorbidities that affect care:    Hypertension, depression, hypothyroid    External Notes reviewed:    None      The following orders were placed and all results were independently analyzed by me:  Orders Placed This Encounter   Procedures    Clostridioides difficile Toxin - Stool, Per Rectum    Enteric Bacterial Panel - Stool, Per Rectum    Clostridioides difficile Toxin, PCR - Stool, Per Rectum    [place] Draw    Comprehensive Metabolic Panel    Lipase    Urinalysis With Microscopic If Indicated (No Culture) - Urine, Clean Catch    Lactic Acid, Plasma    CBC Auto Differential    Magnesium    Urinalysis, Microscopic Only - Urine, Clean Catch    Insert Peripheral IV    CBC & Differential    Green Top (Gel)    Lavender Top    Gold Top - SST    Light Blue Top       Medications Given in the Emergency Department:  Medications   sodium chloride 0.9 % flush 10 mL  (has no administration in time range)        ED Course:         Labs:    Lab Results (last 24 hours)       Procedure Component Value Units Date/Time    CBC & Differential [[id number]]  (Abnormal) Collected: 07/22/25 0118    Specimen: Blood Updated: 07/22/25 0123    Narrative:      The following orders were created for panel order CBC & Differential.  Procedure                               Abnormality         Status                     ---------                               -----------         ------                     CBC Auto Differential[[id number]]        Abnormal            Final result                 Please view results for these tests on the individual orders.    Comprehensive Metabolic Panel [[id number]]  (Abnormal) Collected: 07/22/25 0118    Specimen: Blood Updated: 07/22/25 0154     Glucose 116 mg/dL      BUN 11.7 mg/dL      Creatinine 0.85 mg/dL      Sodium 140 mmol/L      Potassium 3.4 mmol/L      Comment: Specimen hemolyzed.  Result may be falsely elevated.        Chloride 101 mmol/L      CO2 24.9 mmol/L      Calcium 9.5 mg/dL      Total Protein 7.3 g/dL      Albumin 4.4 g/dL      ALT (SGPT) 13 U/L      Comment: Specimen hemolyzed.  Result may  be falsely elevated.        AST (SGOT) 31 U/L      Comment: Specimen hemolyzed.  Result may be falsely elevated.        Alkaline Phosphatase 80 U/L      Total Bilirubin 0.7 mg/dL      Globulin 2.9 gm/dL      A/G Ratio 1.5 g/dL      BUN/Creatinine Ratio 13.8     Anion Gap 14.1 mmol/L      eGFR 66.4 mL/min/1.73     Narrative:      GFR Categories in Chronic Kidney Disease (CKD)              GFR Category          GFR (mL/min/1.73)    Interpretation  G1                    90 or greater        Normal or high (1)  G2                    60-89                Mild decrease (1)  G3a                   45-59                Mild to moderate decrease  G3b                   30-44                Moderate to severe decrease  G4                    15-29                Severe  decrease  G5                    14 or less           Kidney failure    (1)In the absence of evidence of kidney disease, neither GFR category G1 or G2 fulfill the criteria for CKD.    eGFR calculation 2021 CKD-EPI creatinine equation, which does not include race as a factor    Lipase [[id number]]  (Normal) Collected: 07/22/25 0118    Specimen: Blood Updated: 07/22/25 0142     Lipase 24 U/L     Lactic Acid, Plasma [[id number]]  (Normal) Collected: 07/22/25 0118    Specimen: Blood Updated: 07/22/25 0140     Lactate 1.5 mmol/L     CBC Auto Differential [[id number]]  (Abnormal) Collected: 07/22/25 0118    Specimen: Blood Updated: 07/22/25 0123     WBC 13.96 10*3/mm3      RBC 4.34 10*6/mm3      Hemoglobin 14.4 g/dL      Hematocrit 42.8 %      MCV 98.6 fL      MCH 33.2 pg      MCHC 33.6 g/dL      RDW 13.7 %      RDW-SD 50.3 fl      MPV 9.7 fL      Platelets 255 10*3/mm3      Neutrophil % 86.8 %      Lymphocyte % 3.8 %      Monocyte % 8.0 %      Eosinophil % 0.5 %      Basophil % 0.3 %      Immature Grans % 0.6 %      Neutrophils, Absolute 12.11 10*3/mm3      Lymphocytes, Absolute 0.53 10*3/mm3      Monocytes, Absolute 1.12 10*3/mm3      Eosinophils, Absolute 0.07 10*3/mm3      Basophils, Absolute 0.04 10*3/mm3      Immature Grans, Absolute 0.09 10*3/mm3      nRBC 0.0 /100 WBC     Magnesium [[id number]]  (Normal) Collected: 07/22/25 0118    Specimen: Blood Updated: 07/22/25 0226     Magnesium 2.0 mg/dL     Urinalysis With Microscopic If Indicated (No Culture) - Urine, Clean Catch [[id number]]  (Abnormal) Collected: 07/22/25 0140    Specimen: Urine, Clean Catch Updated: 07/22/25 0154     Color, UA Yellow     Appearance, UA Clear     pH, UA 5.5     Specific Gravity, UA 1.016     Glucose, UA Negative     Ketones, UA Trace     Bilirubin, UA Negative     Blood, UA Negative     Protein, UA Negative     Leuk Esterase, UA Moderate (2+)     Nitrite, UA Negative     Urobilinogen, UA 1.0 E.U./dL    Urinalysis, Microscopic Only - Urine,  Clean Catch [[id number]]  (Abnormal) Collected: 07/22/25 0140    Specimen: Urine, Clean Catch Updated: 07/22/25 0156     RBC, UA 0-2 /HPF      WBC, UA 21-50 /HPF      Bacteria, UA None Seen /HPF      Squamous Epithelial Cells, UA 3-6 /HPF      Hyaline Casts, UA 3-6 /LPF      Methodology Automated Microscopy             Imaging:    XR Finger 2+ View Right  Result Date: 7/21/2025  XR FINGER 2+ VW RIGHT Date of Exam: 7/21/2025 1:51 PM EDT Indication: Cat bite right middle finger/rule out foreign body Comparison: None available. FINDINGS:  No fracture is identified. Mineralization and alignment appear within normal limits. Soft tissue prominence in the area of the PIP joint. No foreign body is identified. No definite soft tissue gas. Mild osteoarthritis of the interphalangeal joints.     1.Soft tissue prominence in the area of the PIP joint. 2.No radiopaque foreign body is identified. 3.No radiographic findings of acute osseous abnormality. Electronically Signed: Carlos Enrique Jaffe  7/21/2025 1:54 PM EDT  Workstation ID: [id number]        Differential Diagnosis and Discussion:    Diarrhea: Differential diagnosis includes but is not limited to malabsorption syndrome, bacterial infection, carcinoid syndrome, pancreatic hypersecretion, viral infection, celiac sprue, Crohn's disease, ulcerative colitis, ischemic colitis, colitis, hypermotility, and irritable bowel syndrome.    Labs were collected in the emergency department and all labs were reviewed and interpreted by me.    MDM  Number of Diagnoses or Management Options  Diarrhea, unspecified type  Nausea  Diagnosis management comments: Patient's white blood cell count is 13,000.  This is nonspecific.  The patient's CBC was reviewed and shows no abnormalities of critical concern.  Of note, there is no anemia requiring a blood transfusion and the platelet count is acceptable    The patient's CMP was reviewed and shows no abnormalities of critical concern.  Of note, the patient's  sodium and potassium are acceptable.  The patient's liver enzymes are unremarkable.  The patient's renal function including creatinine is preserved.  The patient has a normal anion gap.    The patient's lactate is normal.  This typically indicates that the patient has normal tissue perfusion as well as severe sepsis is unlikely    Urinalysis is contaminated with 36 squamous epithelial cells.  There is 21-50 WBCs.  This is a cath specimen.  It is nitrite negative.  There is no bacteria seen I do not feel the patient has UTI    The patient presented with diarrhea and watery stool.  However the patient was in the department for 3 hours and had no more further diarrhea.    At the time of disposition, the patient is resting very comfortably no acute distress and nontoxic.  Her nausea has resolved.  She denies abdominal pain and has no abdominal pain on exam.    At this time I do feel that the Augmentin may be causing her nausea and diarrhea.  I will stop the Augmentin and start doxycycline.  I will place the patient on Zofran.  The patient will follow-up with her doctor in 48 hours for reevaluation.  If the patient continues to have diarrhea they will discuss possible need to check their stool for C. difficile with the primary care physician    The patient was given very specific instructions on when and why to return to the emergency room.  The patient voiced understanding and felt comfortable with the discharge instructions.  They would return to the emergency room if necessary.  The patient appears appropriate for discharge and outpatient follow-up.         Amount and/or Complexity of Data Reviewed  Clinical lab tests: reviewed and ordered  Tests in the medicine section of CPT®: reviewed and ordered           Social Determinants of Health:    Patient is independent, reliable, and has access to care.       Disposition and Care Coordination:    Discharged: The patient is suitable and stable for discharge with no need  for consideration of admission.    I have explained discharge medications and the need for follow up with the patient/caretakers. This was also printed in the discharge instructions. Patient was discharged with the following medications and follow up:      Medication List        New Prescriptions      doxycycline 100 MG capsule  Commonly known as: MONODOX  Take 1 capsule by mouth 2 (Two) Times a Day for 7 days.     ondansetron ODT 4 MG disintegrating tablet  Commonly known as: ZOFRAN-ODT  Place 2 tablets on the tongue Every 8 (Eight) Hours As Needed for Nausea or Vomiting for up to 5 days.               Where to Get Your Medications        These medications were sent to [id number] - [place], [place] - [address] AT [facility] [phone] [facility] [phone] [facility]  [address] [place] [address]      Phone: [phone]   doxycycline 100 MG capsule  ondansetron ODT 4 MG disintegrating tablet      Michael Serrano MD  [address] [facility] [address]  [phone]    Schedule an appointment as soon as possible for a visit on 7/24/2025         Final diagnoses:   Diarrhea, unspecified type   Nausea        ED Disposition       ED Disposition   Discharge    Condition   Stable    Comment   --               This medical record created using voice recognition software.             Johny Saleem DO  07/22/25 0303

## 2025-07-22 NOTE — DISCHARGE INSTRUCTIONS
Please stop the antibiotic/Augmentin that was prescribed today and start the doxycycline that was prescribed here in the emergency department as this may be causing your diarrhea    If you continue to have watery diarrhea, discussed the need to check your stool for C. difficile your primary care physician    Please return to the emergency immediately for fever, abdominal pain, intractable vomiting, intractable diarrhea, near passing out, passing out or any new symptoms you are concerned with